# Patient Record
Sex: MALE | Race: WHITE | NOT HISPANIC OR LATINO | Employment: OTHER | URBAN - METROPOLITAN AREA
[De-identification: names, ages, dates, MRNs, and addresses within clinical notes are randomized per-mention and may not be internally consistent; named-entity substitution may affect disease eponyms.]

---

## 2017-05-02 PROBLEM — Z85.828 PERSONAL HISTORY OF OTHER MALIGNANT NEOPLASM OF SKIN: Status: ACTIVE | Noted: 2017-05-02

## 2017-05-02 PROBLEM — C44.319 BASAL CELL CARCINOMA OF SKIN OF OTHER PARTS OF FACE: Status: ACTIVE | Noted: 2017-05-02

## 2017-05-16 PROBLEM — D49.2 NEOPLASM OF UNSPECIFIED BEHAVIOR OF BONE, SOFT TISSUE, AND SKIN: Status: RESOLVED | Noted: 2017-05-02 | Resolved: 2017-05-16

## 2017-07-03 ENCOUNTER — APPOINTMENT (OUTPATIENT)
Dept: RADIOLOGY | Facility: MEDICAL CENTER | Age: 72
End: 2017-07-03
Attending: NEUROLOGICAL SURGERY
Payer: MEDICARE

## 2017-07-03 ENCOUNTER — HOSPITAL ENCOUNTER (OUTPATIENT)
Facility: MEDICAL CENTER | Age: 72
End: 2017-07-03
Attending: NEUROLOGICAL SURGERY | Admitting: NEUROLOGICAL SURGERY
Payer: MEDICARE

## 2017-07-03 VITALS
OXYGEN SATURATION: 92 % | BODY MASS INDEX: 23.09 KG/M2 | HEART RATE: 79 BPM | TEMPERATURE: 98 F | DIASTOLIC BLOOD PRESSURE: 93 MMHG | HEIGHT: 71 IN | WEIGHT: 164.9 LBS | RESPIRATION RATE: 16 BRPM | SYSTOLIC BLOOD PRESSURE: 162 MMHG

## 2017-07-03 DIAGNOSIS — M48.061 SPINAL STENOSIS, LUMBAR REGION, WITHOUT NEUROGENIC CLAUDICATION: ICD-10-CM

## 2017-07-03 PROCEDURE — 160002 HCHG RECOVERY MINUTES (STAT)

## 2017-07-03 PROCEDURE — 72132 CT LUMBAR SPINE W/DYE: CPT

## 2017-07-03 PROCEDURE — 62284 INJECTION FOR MYELOGRAM: CPT

## 2017-07-03 PROCEDURE — 700117 HCHG RX CONTRAST REV CODE 255: Performed by: NEUROLOGICAL SURGERY

## 2017-07-03 RX ADMIN — IOHEXOL 15 ML: 180 INJECTION INTRAVENOUS at 14:10

## 2017-07-03 ASSESSMENT — PAIN SCALES - GENERAL
PAINLEVEL_OUTOF10: 0

## 2017-07-03 NOTE — OR SURGEON
Immediate Post- Operative Note  PostOp Diagnosis: lumbar stenosis      Procedure(s): fluoro guided lumbar puncture      Estimated Blood Loss: Less than 5 ml        Complications: None            7/3/2017     2:38 PM     Cyril Noyola

## 2017-07-03 NOTE — OR NURSING
1434 Received to PPU #6, awake and alert. With band aid to lumbar area,CDI. Denied c/o pain or numbness or tingling and able to move all extremities w/o difficulties and no  weakness noted. Noted nerve stimulator generator in  Left flank area.  1445 Given po fluids and snacks.  1510 Tolerated po intake well. No complaints. Called wife via phone and given update.  1600 Sleeping , no signs of discomfort.  1619 Given more snacks. Site w/o bleeding.  1715 Called Dr Noyola and got order to discharge patient, carried out.  1720 CNA assisted patient up and prepared for discharge. Wore back brace and used cane with ambulation. Tolerated activities w/o c/o headache or other discomfort.  1732 Dc'd home with wife by CNA on a wheelchair with all belongings.

## 2017-07-03 NOTE — IP AVS SNAPSHOT
" Home Care Instructions                                                                                                                Name:Duane Stuart Wilkerson  Medical Record Number:[id number]  CSN: [id number]    YOB: 1945   Age: 71 y.o.  Sex: male  HT:1.803 m (5' 11\") WT: 74.8 kg (164 lb 14.5 oz)          Admit Date: 7/3/2017     Discharge Date:   Today's Date: 7/3/2017  Attending Doctor:  Maria Antonia Martines M.D.                  Allergies:  Review of patient's allergies indicates no known allergies.                Discharge Instructions        [phone] is, in general, a safe procedure with only minor side effects. The following instructions have been written in order to assist you in preventing side effects and to improve your safety.     1. Do not drive a motorized vehicle for at least 18 hours following your myelogram. A friend or family member should be available to take you home following your discharge.   2. Resume your usual diet.  3. Drink plenty of fluids.  4. Do not drink alcohol for 24 hours following your myelogram.  5. If headaches develop, a mild analgesic such as Tylenol can be taken.   6. No strenuous activity should be undertaken for at least 24 hours following the myelogram.  7. When you are in bed or laying down at home, your head should be elevated at least 30 degrees (on at least two pillows) for 24 hours following the myelogram.  8. You should spend tonight in the same house with a responsible person in case any difficulties should arise.   9. If any questions arise, call the radiologist at [phone] or your Physicians office. You can also call the HEALTH HOTLINE open 24 hours/day, 7 days/week and speak to a nurse at [phone], or toll free at [phone].  10. You should call 911 if you develop problems with breathing or chest pain.    FOR PROBLEMS CALL [facility] AT: [phone]  FOLLOW UP WITH DR MARTINES IN 1 WEEK CALL TO SCHEDULE -[phone]    I acknowledge " receipt and understanding of these Home Care instructions.       Medication List      ASK your doctor about these medications        Instructions    Morning Afternoon Evening Bedtime    hydrochlorothiazide 25 MG Tabs   Commonly known as:  HYDRODIURIL        Take 1 Tab by mouth every day.   Dose:  25 mg                        lactobacillus rhamnosus (GG) Caps   Commonly known as:  CULTURELLE        Take 1 Cap by mouth every day.   Dose:  1 Cap                        omeprazole 20 MG delayed-release capsule   Commonly known as:  PRILOSEC        Take 1 Cap by mouth every day.   Dose:  20 mg                        oxycodone immediate release 10 MG immediate release tablet   Commonly known as:  ROXICODONE        Take 1 Tab by mouth every four hours as needed.   Dose:  10 mg                        potassium chloride SA 20 MEQ Tbcr   Commonly known as:  Kdur        Take 2 Tabs by mouth 2 Times a Day.   Dose:  40 mEq                        sertraline 100 MG Tabs   Commonly known as:  ZOLOFT        Take 1 Tab by mouth every day.   Dose:  100 mg                        simvastatin 40 MG Tabs   Commonly known as:  ZOCOR        Take 1 Tab by mouth every evening.   Dose:  40 mg                        tamsulosin 0.4 MG capsule   Commonly known as:  FLOMAX        Take 1 Cap by mouth ONE-HALF HOUR AFTER BREAKFAST.   Dose:  0.4 mg                                Medication Information     Above and/or attached are the medications your physician expects you to take upon discharge. Review all of your home medications and newly ordered medications with your doctor and/or pharmacist. Follow medication instructions as directed by your doctor and/or pharmacist. Please keep your medication list with you and share with your physician. Update the information when medications are discontinued, doses are changed, or new medications (including over-the-counter products) are added; and carry medication information at all times in the event of  emergency situations.        Resources     Quit Smoking / Tobacco Use:    I understand the use of any tobacco products increases my chance of suffering from future heart disease or stroke and could cause other illnesses which may shorten my life. Quitting the use of tobacco products is the single most important thing I can do to improve my health. For further information on smoking / tobacco cessation call a Toll Free Quit Line at 1-352.184.1992 (*[employer] [place]) or 1-820.666.9546 (American [employer] Association) or you can access the web based program at www.lungusa.org.    [place] Tobacco Users Help Line:  (641) 127-8567       Toll Free: 1-274.149.5301  Quit Tobacco Program [facility] (268)662-0535    Crisis Hotline:    [place] Crisis Hotline:  0-139-JLNJTFV or 1-599.611.3462    [place] Crisis Hotline:    1-383.177.3020 or 557-189-9091    Discharge Survey:   Thank you for choosing [facility]. We hope we did everything we could to make your hospital stay a pleasant one. You may be receiving a survey and we would appreciate your time and participation in answering the questions. Your input is very valuable to us in our efforts to improve our service to our patients and their families.            Signatures     My signature on this form indicates that:    1. I acknowledge receipt and understanding of these Home Care Instruction.  2. My questions regarding this information have been answered to my satisfaction.  3. I have formulated a plan with my discharge nurse to obtain my prescribed medications for home.    __________________________________      __________________________________                   Patient Signature                                 Guardian/Responsible Adult Signature      __________________________________                 __________       ________                       Nurse Signature                                               Date                 Time

## 2017-07-03 NOTE — DISCHARGE INSTRUCTIONS
[phone] is, in general, a safe procedure with only minor side effects. The following instructions have been written in order to assist you in preventing side effects and to improve your safety.     1. Do not drive a motorized vehicle for at least 18 hours following your myelogram. A friend or family member should be available to take you home following your discharge.   2. Resume your usual diet.  3. Drink plenty of fluids.  4. Do not drink alcohol for 24 hours following your myelogram.  5. If headaches develop, a mild analgesic such as Tylenol can be taken.   6. No strenuous activity should be undertaken for at least 24 hours following the myelogram.  7. When you are in bed or laying down at home, your head should be elevated at least 30 degrees (on at least two pillows) for 24 hours following the myelogram.  8. You should spend tonight in the same house with a responsible person in case any difficulties should arise.   9. If any questions arise, call the radiologist at [phone] or your Physicians office. You can also call the HEALTH HOTLINE open 24 hours/day, 7 days/week and speak to a nurse at [phone], or toll free at [phone].  10. You should call 911 if you develop problems with breathing or chest pain.    FOR PROBLEMS CALL [facility] AT: [phone]  FOLLOW UP WITH DR MARTINES IN 1 WEEK CALL TO SCHEDULE -[phone]    I acknowledge receipt and understanding of these Home Care instructions.

## 2017-07-03 NOTE — IP AVS SNAPSHOT
7/3/2017    Duane Ryan Leon  [address]  [address]  [facility] [address]    Dear Duane:    [facility] wants to ensure your discharge home is safe and you or your loved ones have had all of your questions answered regarding your care after you leave the hospital.    Below is a list of resources and contact information should you have any questions regarding your hospital stay, follow-up instructions, or active medical symptoms.    Questions or Concerns Regarding… Contact   Medical Questions Related to Your Discharge  (7 days a week, 8am-5pm) Contact a Nurse Care Coordinator   [phone]   Medical Questions Not Related to Your Discharge  (24 hours a day / 7 days a week)  Contact the Nurse Health Line   [phone]    Medications or Discharge Instructions Refer to your discharge packet   or contact your [facility] Primary Care Provider   [phone]   Follow-up Appointment(s) Schedule your appointment via [employer]   or contact Scheduling [phone]   Billing Review your statement via [employer]  or contact Billing [phone]   Medical Records Review your records via [employer]   or contact Medical Records [phone]     You may receive a telephone call within two days of discharge. This call is to make certain you understand your discharge instructions and have the opportunity to have any questions answered. You can also easily access your medical information, test results and upcoming appointments via the [employer] free online health management tool. You can learn more and sign up at [employer]. For assistance setting up your [employer] account, please call [phone].    Once again, we want to ensure your discharge home is safe and that you have a clear understanding of any next steps in your care. If you have any questions or concerns, please do not hesitate to contact us, we are here for you. Thank you for choosing [facility] for your healthcare needs.    Sincerely,    Your [facility]  Team

## 2017-07-06 ENCOUNTER — HOSPITAL ENCOUNTER (OUTPATIENT)
Dept: RADIOLOGY | Facility: MEDICAL CENTER | Age: 72
End: 2017-07-06
Attending: PHYSICIAN ASSISTANT
Payer: MEDICARE

## 2017-07-06 DIAGNOSIS — I73.9 PERIPHERAL VASCULAR DISEASE, UNSPECIFIED (HCC): ICD-10-CM

## 2017-07-06 PROCEDURE — 93925 LOWER EXTREMITY STUDY: CPT

## 2017-07-06 PROCEDURE — 93922 UPR/L XTREMITY ART 2 LEVELS: CPT

## 2017-08-11 ENCOUNTER — HOSPITAL ENCOUNTER (OUTPATIENT)
Dept: PAIN MANAGEMENT | Facility: REHABILITATION | Age: 72
End: 2017-08-11
Attending: PHYSICAL MEDICINE & REHABILITATION
Payer: MEDICARE

## 2017-08-11 ENCOUNTER — HOSPITAL ENCOUNTER (OUTPATIENT)
Dept: RADIOLOGY | Facility: REHABILITATION | Age: 72
End: 2017-08-11
Attending: PHYSICAL MEDICINE & REHABILITATION
Payer: MEDICARE

## 2017-08-11 VITALS
HEIGHT: 71 IN | OXYGEN SATURATION: 98 % | RESPIRATION RATE: 18 BRPM | DIASTOLIC BLOOD PRESSURE: 83 MMHG | HEART RATE: 75 BPM | TEMPERATURE: 97.4 F | SYSTOLIC BLOOD PRESSURE: 130 MMHG | BODY MASS INDEX: 22.93 KG/M2 | WEIGHT: 163.8 LBS

## 2017-08-11 PROCEDURE — 700111 HCHG RX REV CODE 636 W/ 250 OVERRIDE (IP)

## 2017-08-11 PROCEDURE — 700117 HCHG RX CONTRAST REV CODE 255

## 2017-08-11 PROCEDURE — 64484 NJX AA&/STRD TFRM EPI L/S EA: CPT

## 2017-08-11 PROCEDURE — 64483 NJX AA&/STRD TFRM EPI L/S 1: CPT

## 2017-08-11 RX ORDER — LIDOCAINE HYDROCHLORIDE 10 MG/ML
INJECTION, SOLUTION EPIDURAL; INFILTRATION; INTRACAUDAL; PERINEURAL
Status: COMPLETED
Start: 2017-08-11 | End: 2017-08-11

## 2017-08-11 RX ORDER — DEXAMETHASONE SODIUM PHOSPHATE 10 MG/ML
INJECTION, SOLUTION INTRAMUSCULAR; INTRAVENOUS
Status: COMPLETED
Start: 2017-08-11 | End: 2017-08-11

## 2017-08-11 RX ADMIN — IOHEXOL 5 ML: 240 INJECTION, SOLUTION INTRATHECAL; INTRAVASCULAR; INTRAVENOUS; ORAL at 14:00

## 2017-08-11 RX ADMIN — DEXAMETHASONE SODIUM PHOSPHATE 10 MG: 10 INJECTION, SOLUTION INTRAMUSCULAR; INTRAVENOUS at 14:00

## 2017-08-11 RX ADMIN — LIDOCAINE HYDROCHLORIDE 5 ML: 10 INJECTION, SOLUTION EPIDURAL; INFILTRATION; INTRACAUDAL; PERINEURAL at 14:30

## 2017-08-11 ASSESSMENT — PAIN SCALES - GENERAL
PAINLEVEL_OUTOF10: 2
PAINLEVEL_OUTOF10: 3

## 2017-08-11 NOTE — PROGRESS NOTES
Current medications reviewed with pt, see medications reconciliation form. Pt rufino taking ASA or other blood thinners or anti-inflammatories.  Pt has a ride post-procedure(wife to drive).  Printed and verbal discharge instructions given to pt who verbalized understanding.

## 2017-08-11 NOTE — PROCEDURES
SELECTIVE NERVE ROOT BLOCK/TRANSFORAMINAL EPIDURAL INJECTION    LEVEL(S): L1-L2, L2-L3   SIDE: LEFT     PRE-PROCEDURE DIAGNOSIS: Intervebral disc displacement/degeneration  POST-PROCEDURE DIAGNOSIS: Same  PHYSICIAN: Maxx Gore MD  ANESTHESIA: Local     The patient was interviewed and the medical record reviewed.  There were no medical, pharmacologic, radiographic or other structural contraindications to attempting fluoroscopically guided selective nerve root block/transforaminal epidural steroid injection.  Risks and expected side effects (including, but not limited to, potential for failure of the procedure or worsening of pain, bleeding, infection, nerve injury, dural tear with spinal fluid leak, systemic reaction to the contrast, anesthetic, or steroid, anaphylactic shock, and elevation of blood sugar) as well as potential benefit of the procedure were reviewed with the patient and all concerns addressed.  The printed consent form was signed and witnessed. A standard time-out procedure was performed.     DESCRIPTION OF PROCEDURE:  The patient was placed in the prone position on the fluoroscopy table. A Chlorhexidine prep was performed in a wide area over the lumbar region and sterile drapes were applied. Fluoroscopy was utilized to identify the above documented neural foramen/foramina.  Local anesthesia was obtained with 1% lidocaine. A 22 gauge curved tip spinal needle was then inserted, advanced with fluoroscopic guidance into each neural foramen, confirmed on the lateral view.  After negative aspiration, 1 ml of Omnipaque 300 was injected confirming position in A/P and lateral views.  This showed a good spread of dye transforaminally into the epidural space.  There was no vascular update with contrast injection under continuous fluoroscopy. 5 mg of Dexamethasone was injected, followed by 0.5 ml of 1% Lidocaine at each of the above documented levels.      The patient tolerated the procedure well.  Post-procedural instructions were provided and follow up plans discussed. The patient was instructed to contact the office if any problems any possible injection related problems or side effects were experienced. After having met discharge criteria, the patient was discharged in good condition.     Comments: No complications

## 2017-09-12 ENCOUNTER — HOSPITAL ENCOUNTER (OUTPATIENT)
Dept: LAB | Facility: MEDICAL CENTER | Age: 72
End: 2017-09-12
Attending: SURGERY
Payer: MEDICARE

## 2017-09-12 ENCOUNTER — HOSPITAL ENCOUNTER (OUTPATIENT)
Dept: RADIOLOGY | Facility: MEDICAL CENTER | Age: 72
End: 2017-09-12
Attending: SURGERY
Payer: MEDICARE

## 2017-09-12 DIAGNOSIS — I73.9 PERIPHERAL VASCULAR DISEASE, UNSPECIFIED (HCC): ICD-10-CM

## 2017-09-12 LAB
CREAT SERPL-MCNC: 1.03 MG/DL (ref 0.5–1.4)
GFR SERPL CREATININE-BSD FRML MDRD: >60 ML/MIN/1.73 M 2

## 2017-09-12 PROCEDURE — 75635 CT ANGIO ABDOMINAL ARTERIES: CPT

## 2017-09-12 PROCEDURE — 700117 HCHG RX CONTRAST REV CODE 255: Performed by: SURGERY

## 2017-09-12 PROCEDURE — 82565 ASSAY OF CREATININE: CPT

## 2017-09-12 PROCEDURE — 36415 COLL VENOUS BLD VENIPUNCTURE: CPT

## 2017-09-12 RX ADMIN — IOHEXOL 100 ML: 350 INJECTION, SOLUTION INTRAVENOUS at 16:40

## 2017-10-23 ENCOUNTER — APPOINTMENT (OUTPATIENT)
Dept: ADMISSIONS | Facility: MEDICAL CENTER | Age: 72
DRG: 272 | End: 2017-10-23
Attending: SURGERY
Payer: MEDICARE

## 2017-10-23 RX ORDER — SIMVASTATIN 40 MG
40 TABLET ORAL
COMMUNITY

## 2017-10-23 RX ORDER — HYDROCHLOROTHIAZIDE 25 MG/1
25 TABLET ORAL
COMMUNITY

## 2017-10-23 RX ORDER — BENAZEPRIL HYDROCHLORIDE 10 MG/1
10 TABLET ORAL
COMMUNITY

## 2017-10-23 RX ORDER — OXYBUTYNIN CHLORIDE 5 MG/1
5 TABLET ORAL
COMMUNITY

## 2017-10-23 RX ORDER — DESONIDE 0.5 MG/G
CREAM TOPICAL
Status: ON HOLD | COMMUNITY
End: 2018-08-02

## 2017-10-23 RX ORDER — SERTRALINE HYDROCHLORIDE 100 MG/1
100 TABLET, FILM COATED ORAL
COMMUNITY

## 2017-10-23 RX ORDER — ERGOCALCIFEROL 1.25 MG/1
50000 CAPSULE ORAL
Status: ON HOLD | COMMUNITY
End: 2018-08-02

## 2017-10-23 RX ORDER — OMEPRAZOLE 20 MG/1
20 CAPSULE, DELAYED RELEASE ORAL
COMMUNITY

## 2017-10-26 ENCOUNTER — APPOINTMENT (OUTPATIENT)
Dept: RADIOLOGY | Facility: MEDICAL CENTER | Age: 72
DRG: 272 | End: 2017-10-26
Attending: SURGERY
Payer: MEDICARE

## 2017-10-26 ENCOUNTER — HOSPITAL ENCOUNTER (INPATIENT)
Facility: MEDICAL CENTER | Age: 72
LOS: 3 days | DRG: 272 | End: 2017-10-29
Attending: SURGERY | Admitting: SURGERY
Payer: MEDICARE

## 2017-10-26 PROBLEM — I70.212 ATHEROSCLEROSIS OF NATIVE ARTERY OF LEFT LOWER EXTREMITY WITH INTERMITTENT CLAUDICATION (HCC): Status: ACTIVE | Noted: 2017-10-26

## 2017-10-26 LAB
ANION GAP SERPL CALC-SCNC: 7 MMOL/L (ref 0–11.9)
BUN SERPL-MCNC: 17 MG/DL (ref 8–22)
CALCIUM SERPL-MCNC: 8.9 MG/DL (ref 8.5–10.5)
CHLORIDE SERPL-SCNC: 102 MMOL/L (ref 96–112)
CO2 SERPL-SCNC: 24 MMOL/L (ref 20–33)
CREAT SERPL-MCNC: 1.01 MG/DL (ref 0.5–1.4)
EKG IMPRESSION: NORMAL
ERYTHROCYTE [DISTWIDTH] IN BLOOD BY AUTOMATED COUNT: 48.5 FL (ref 35.9–50)
GFR SERPL CREATININE-BSD FRML MDRD: >60 ML/MIN/1.73 M 2
GLUCOSE SERPL-MCNC: 81 MG/DL (ref 65–99)
HCT VFR BLD AUTO: 38.5 % (ref 42–52)
HGB BLD-MCNC: 13.6 G/DL (ref 14–18)
MCH RBC QN AUTO: 35.3 PG (ref 27–33)
MCHC RBC AUTO-ENTMCNC: 35.3 G/DL (ref 33.7–35.3)
MCV RBC AUTO: 100 FL (ref 81.4–97.8)
PLATELET # BLD AUTO: 181 K/UL (ref 164–446)
PMV BLD AUTO: 9.1 FL (ref 9–12.9)
POTASSIUM SERPL-SCNC: 3.7 MMOL/L (ref 3.6–5.5)
RBC # BLD AUTO: 3.85 M/UL (ref 4.7–6.1)
SODIUM SERPL-SCNC: 133 MMOL/L (ref 135–145)
WBC # BLD AUTO: 7 K/UL (ref 4.8–10.8)

## 2017-10-26 PROCEDURE — 770006 HCHG ROOM/CARE - MED/SURG/GYN SEMI*

## 2017-10-26 PROCEDURE — A9270 NON-COVERED ITEM OR SERVICE: HCPCS | Performed by: SURGERY

## 2017-10-26 PROCEDURE — 700102 HCHG RX REV CODE 250 W/ 637 OVERRIDE(OP): Performed by: SURGERY

## 2017-10-26 PROCEDURE — 500371 HCHG DRAIN, BLAKE 10MM: Performed by: SURGERY

## 2017-10-26 PROCEDURE — 501445 HCHG STAPLER, SKIN DISP: Performed by: SURGERY

## 2017-10-26 PROCEDURE — 04UL3KZ SUPPLEMENT LEFT FEMORAL ARTERY WITH NONAUTOLOGOUS TISSUE SUBSTITUTE, PERCUTANEOUS APPROACH: ICD-10-PCS | Performed by: SURGERY

## 2017-10-26 PROCEDURE — 700101 HCHG RX REV CODE 250

## 2017-10-26 PROCEDURE — 501498 HCHG SURG-I-LOOP, MAXI (BLUE): Performed by: SURGERY

## 2017-10-26 PROCEDURE — 85027 COMPLETE CBC AUTOMATED: CPT

## 2017-10-26 PROCEDURE — 93005 ELECTROCARDIOGRAM TRACING: CPT | Performed by: SURGERY

## 2017-10-26 PROCEDURE — 93010 ELECTROCARDIOGRAM REPORT: CPT | Performed by: INTERNAL MEDICINE

## 2017-10-26 PROCEDURE — 501837 HCHG SUTURE CV: Performed by: SURGERY

## 2017-10-26 PROCEDURE — 160029 HCHG SURGERY MINUTES - 1ST 30 MINS LEVEL 4: Performed by: SURGERY

## 2017-10-26 PROCEDURE — 700102 HCHG RX REV CODE 250 W/ 637 OVERRIDE(OP)

## 2017-10-26 PROCEDURE — 160048 HCHG OR STATISTICAL LEVEL 1-5: Performed by: SURGERY

## 2017-10-26 PROCEDURE — C1894 INTRO/SHEATH, NON-LASER: HCPCS | Performed by: SURGERY

## 2017-10-26 PROCEDURE — A9270 NON-COVERED ITEM OR SERVICE: HCPCS

## 2017-10-26 PROCEDURE — 700111 HCHG RX REV CODE 636 W/ 250 OVERRIDE (IP)

## 2017-10-26 PROCEDURE — C1876 STENT, NON-COA/NON-COV W/DEL: HCPCS | Performed by: SURGERY

## 2017-10-26 PROCEDURE — 500389 HCHG DRAIN, RESERVOIR SUCT JP 100CC: Performed by: SURGERY

## 2017-10-26 PROCEDURE — 500700 HCHG HEMOCLIP, SMALL (RED): Performed by: SURGERY

## 2017-10-26 PROCEDURE — 500043 HCHG BAG-A-JET: Performed by: SURGERY

## 2017-10-26 PROCEDURE — A6402 STERILE GAUZE <= 16 SQ IN: HCPCS | Performed by: SURGERY

## 2017-10-26 PROCEDURE — 700101 HCHG RX REV CODE 250: Performed by: SURGERY

## 2017-10-26 PROCEDURE — 160041 HCHG SURGERY MINUTES - EA ADDL 1 MIN LEVEL 4: Performed by: SURGERY

## 2017-10-26 PROCEDURE — 501838 HCHG SUTURE GENERAL: Performed by: SURGERY

## 2017-10-26 PROCEDURE — 500896 HCHG PACK, VASCULAR (FOR ROOM 10): Performed by: SURGERY

## 2017-10-26 PROCEDURE — C1769 GUIDE WIRE: HCPCS | Performed by: SURGERY

## 2017-10-26 PROCEDURE — 501499 HCHG SURG-I-LOOP, MINI (BLUE): Performed by: SURGERY

## 2017-10-26 PROCEDURE — 160009 HCHG ANES TIME/MIN: Performed by: SURGERY

## 2017-10-26 PROCEDURE — 503055 HCHG PATCH XENOSURE 6-8 CM: Performed by: SURGERY

## 2017-10-26 PROCEDURE — 160002 HCHG RECOVERY MINUTES (STAT): Performed by: SURGERY

## 2017-10-26 PROCEDURE — 80048 BASIC METABOLIC PNL TOTAL CA: CPT

## 2017-10-26 PROCEDURE — 700112 HCHG RX REV CODE 229: Performed by: SURGERY

## 2017-10-26 PROCEDURE — 160036 HCHG PACU - EA ADDL 30 MINS PHASE I: Performed by: SURGERY

## 2017-10-26 PROCEDURE — B41D1ZZ FLUOROSCOPY OF AORTA AND BILATERAL LOWER EXTREMITY ARTERIES USING LOW OSMOLAR CONTRAST: ICD-10-PCS | Performed by: SURGERY

## 2017-10-26 PROCEDURE — 500697 HCHG HEMOCLIP, LARGE (ORANGE): Performed by: SURGERY

## 2017-10-26 PROCEDURE — 160035 HCHG PACU - 1ST 60 MINS PHASE I: Performed by: SURGERY

## 2017-10-26 PROCEDURE — 04CL3ZZ EXTIRPATION OF MATTER FROM LEFT FEMORAL ARTERY, PERCUTANEOUS APPROACH: ICD-10-PCS | Performed by: SURGERY

## 2017-10-26 PROCEDURE — 500698 HCHG HEMOCLIP, MEDIUM: Performed by: SURGERY

## 2017-10-26 PROCEDURE — 700111 HCHG RX REV CODE 636 W/ 250 OVERRIDE (IP): Performed by: SURGERY

## 2017-10-26 DEVICE — IMPLANTABLE DEVICE: Type: IMPLANTABLE DEVICE | Status: FUNCTIONAL

## 2017-10-26 DEVICE — PATCH .8X8CM XENOSURE BIOLOGIC VASCULAR---ORDER IN MULTIPLES OF 5---: Type: IMPLANTABLE DEVICE | Status: FUNCTIONAL

## 2017-10-26 RX ORDER — OXYCODONE HYDROCHLORIDE 10 MG/1
10 TABLET ORAL
Status: DISCONTINUED | OUTPATIENT
Start: 2017-10-26 | End: 2017-10-29 | Stop reason: HOSPADM

## 2017-10-26 RX ORDER — DEXTROSE MONOHYDRATE, SODIUM CHLORIDE, AND POTASSIUM CHLORIDE 50; 1.49; 4.5 G/1000ML; G/1000ML; G/1000ML
INJECTION, SOLUTION INTRAVENOUS CONTINUOUS
Status: DISCONTINUED | OUTPATIENT
Start: 2017-10-26 | End: 2017-10-29 | Stop reason: HOSPADM

## 2017-10-26 RX ORDER — HYDROCHLOROTHIAZIDE 25 MG/1
25 TABLET ORAL
Status: DISCONTINUED | OUTPATIENT
Start: 2017-10-26 | End: 2017-10-29 | Stop reason: HOSPADM

## 2017-10-26 RX ORDER — LIDOCAINE AND PRILOCAINE 25; 25 MG/G; MG/G
1 CREAM TOPICAL
Status: COMPLETED | OUTPATIENT
Start: 2017-10-26 | End: 2017-10-26

## 2017-10-26 RX ORDER — CELECOXIB 200 MG/1
200 CAPSULE ORAL 2 TIMES DAILY WITH MEALS
Status: DISCONTINUED | OUTPATIENT
Start: 2017-10-27 | End: 2017-10-29 | Stop reason: HOSPADM

## 2017-10-26 RX ORDER — DIPHENHYDRAMINE HYDROCHLORIDE 50 MG/ML
25 INJECTION INTRAMUSCULAR; INTRAVENOUS EVERY 6 HOURS PRN
Status: DISCONTINUED | OUTPATIENT
Start: 2017-10-26 | End: 2017-10-29 | Stop reason: HOSPADM

## 2017-10-26 RX ORDER — SODIUM CHLORIDE, SODIUM LACTATE, POTASSIUM CHLORIDE, CALCIUM CHLORIDE 600; 310; 30; 20 MG/100ML; MG/100ML; MG/100ML; MG/100ML
INJECTION, SOLUTION INTRAVENOUS CONTINUOUS
Status: DISCONTINUED | OUTPATIENT
Start: 2017-10-26 | End: 2017-10-29 | Stop reason: HOSPADM

## 2017-10-26 RX ORDER — DEXAMETHASONE SODIUM PHOSPHATE 4 MG/ML
4 INJECTION, SOLUTION INTRA-ARTICULAR; INTRALESIONAL; INTRAMUSCULAR; INTRAVENOUS; SOFT TISSUE
Status: DISCONTINUED | OUTPATIENT
Start: 2017-10-26 | End: 2017-10-29 | Stop reason: HOSPADM

## 2017-10-26 RX ORDER — BENAZEPRIL HYDROCHLORIDE 20 MG/1
10 TABLET ORAL
Status: DISCONTINUED | OUTPATIENT
Start: 2017-10-26 | End: 2017-10-29 | Stop reason: HOSPADM

## 2017-10-26 RX ORDER — LIDOCAINE HYDROCHLORIDE 10 MG/ML
INJECTION, SOLUTION INFILTRATION; PERINEURAL
Status: COMPLETED
Start: 2017-10-26 | End: 2017-10-26

## 2017-10-26 RX ORDER — ALBUTEROL SULFATE 90 UG/1
2 AEROSOL, METERED RESPIRATORY (INHALATION) EVERY 4 HOURS PRN
Status: DISCONTINUED | OUTPATIENT
Start: 2017-10-26 | End: 2017-10-29 | Stop reason: HOSPADM

## 2017-10-26 RX ORDER — TAMSULOSIN HYDROCHLORIDE 0.4 MG/1
0.4 CAPSULE ORAL EVERY EVENING
COMMUNITY

## 2017-10-26 RX ORDER — OXYCODONE HYDROCHLORIDE 5 MG/1
5 TABLET ORAL
Status: DISCONTINUED | OUTPATIENT
Start: 2017-10-26 | End: 2017-10-29 | Stop reason: HOSPADM

## 2017-10-26 RX ORDER — SIMVASTATIN 40 MG
40 TABLET ORAL
Status: DISCONTINUED | OUTPATIENT
Start: 2017-10-26 | End: 2017-10-29 | Stop reason: HOSPADM

## 2017-10-26 RX ORDER — OXYCODONE HCL 5 MG/5 ML
SOLUTION, ORAL ORAL
Status: COMPLETED
Start: 2017-10-26 | End: 2017-10-26

## 2017-10-26 RX ORDER — LIDOCAINE HYDROCHLORIDE 10 MG/ML
0.5 INJECTION, SOLUTION INFILTRATION; PERINEURAL
Status: COMPLETED | OUTPATIENT
Start: 2017-10-26 | End: 2017-10-26

## 2017-10-26 RX ORDER — DESONIDE 0.5 MG/G
CREAM TOPICAL
Status: DISCONTINUED | OUTPATIENT
Start: 2017-10-26 | End: 2017-10-29 | Stop reason: HOSPADM

## 2017-10-26 RX ORDER — HYDRALAZINE HYDROCHLORIDE 20 MG/ML
10 INJECTION INTRAMUSCULAR; INTRAVENOUS
Status: DISCONTINUED | OUTPATIENT
Start: 2017-10-26 | End: 2017-10-29 | Stop reason: HOSPADM

## 2017-10-26 RX ORDER — DOCUSATE SODIUM 100 MG/1
100 CAPSULE, LIQUID FILLED ORAL 2 TIMES DAILY
Status: DISCONTINUED | OUTPATIENT
Start: 2017-10-26 | End: 2017-10-29 | Stop reason: HOSPADM

## 2017-10-26 RX ORDER — OMEPRAZOLE 20 MG/1
20 CAPSULE, DELAYED RELEASE ORAL
Status: DISCONTINUED | OUTPATIENT
Start: 2017-10-26 | End: 2017-10-29 | Stop reason: HOSPADM

## 2017-10-26 RX ORDER — SERTRALINE HYDROCHLORIDE 100 MG/1
100 TABLET, FILM COATED ORAL
Status: DISCONTINUED | OUTPATIENT
Start: 2017-10-26 | End: 2017-10-29 | Stop reason: HOSPADM

## 2017-10-26 RX ORDER — BUPIVACAINE HYDROCHLORIDE AND EPINEPHRINE 5; 5 MG/ML; UG/ML
INJECTION, SOLUTION EPIDURAL; INTRACAUDAL; PERINEURAL
Status: DISCONTINUED | OUTPATIENT
Start: 2017-10-26 | End: 2017-10-26 | Stop reason: HOSPADM

## 2017-10-26 RX ORDER — DIPHENHYDRAMINE HCL 25 MG
25 TABLET ORAL EVERY 6 HOURS PRN
Status: DISCONTINUED | OUTPATIENT
Start: 2017-10-26 | End: 2017-10-29 | Stop reason: HOSPADM

## 2017-10-26 RX ORDER — TAMSULOSIN HYDROCHLORIDE 0.4 MG/1
0.4 CAPSULE ORAL EVERY EVENING
Status: DISCONTINUED | OUTPATIENT
Start: 2017-10-26 | End: 2017-10-29 | Stop reason: HOSPADM

## 2017-10-26 RX ORDER — ACETAMINOPHEN 325 MG/1
650 TABLET ORAL EVERY 6 HOURS
Status: DISCONTINUED | OUTPATIENT
Start: 2017-10-26 | End: 2017-10-29 | Stop reason: HOSPADM

## 2017-10-26 RX ORDER — DEXTROSE MONOHYDRATE, SODIUM CHLORIDE, AND POTASSIUM CHLORIDE 50; 1.49; 4.5 G/1000ML; G/1000ML; G/1000ML
INJECTION, SOLUTION INTRAVENOUS
Status: DISPENSED
Start: 2017-10-26 | End: 2017-10-27

## 2017-10-26 RX ORDER — OXYBUTYNIN CHLORIDE 5 MG/1
5 TABLET ORAL
Status: DISCONTINUED | OUTPATIENT
Start: 2017-10-26 | End: 2017-10-29 | Stop reason: HOSPADM

## 2017-10-26 RX ORDER — HEPARIN SODIUM,PORCINE 1000/ML
VIAL (ML) INJECTION
Status: DISCONTINUED | OUTPATIENT
Start: 2017-10-26 | End: 2017-10-26 | Stop reason: HOSPADM

## 2017-10-26 RX ORDER — TRAZODONE HYDROCHLORIDE 50 MG/1
50 TABLET ORAL NIGHTLY PRN
Status: DISCONTINUED | OUTPATIENT
Start: 2017-10-26 | End: 2017-10-29 | Stop reason: HOSPADM

## 2017-10-26 RX ORDER — SCOLOPAMINE TRANSDERMAL SYSTEM 1 MG/1
1 PATCH, EXTENDED RELEASE TRANSDERMAL
Status: DISCONTINUED | OUTPATIENT
Start: 2017-10-26 | End: 2017-10-29 | Stop reason: HOSPADM

## 2017-10-26 RX ORDER — IPRATROPIUM BROMIDE AND ALBUTEROL SULFATE 2.5; .5 MG/3ML; MG/3ML
3 SOLUTION RESPIRATORY (INHALATION)
Status: DISCONTINUED | OUTPATIENT
Start: 2017-10-26 | End: 2017-10-29 | Stop reason: HOSPADM

## 2017-10-26 RX ORDER — HALOPERIDOL 5 MG/ML
1 INJECTION INTRAMUSCULAR EVERY 6 HOURS PRN
Status: DISCONTINUED | OUTPATIENT
Start: 2017-10-26 | End: 2017-10-29 | Stop reason: HOSPADM

## 2017-10-26 RX ORDER — ONDANSETRON 2 MG/ML
4 INJECTION INTRAMUSCULAR; INTRAVENOUS EVERY 4 HOURS PRN
Status: DISCONTINUED | OUTPATIENT
Start: 2017-10-26 | End: 2017-10-29 | Stop reason: HOSPADM

## 2017-10-26 RX ORDER — CALCIUM CARBONATE 500 MG/1
500 TABLET, CHEWABLE ORAL
Status: DISCONTINUED | OUTPATIENT
Start: 2017-10-26 | End: 2017-10-29 | Stop reason: HOSPADM

## 2017-10-26 RX ORDER — KETOROLAC TROMETHAMINE 30 MG/ML
15 INJECTION, SOLUTION INTRAMUSCULAR; INTRAVENOUS EVERY 6 HOURS
Status: DISPENSED | OUTPATIENT
Start: 2017-10-26 | End: 2017-10-27

## 2017-10-26 RX ORDER — LABETALOL HYDROCHLORIDE 5 MG/ML
10 INJECTION, SOLUTION INTRAVENOUS
Status: DISCONTINUED | OUTPATIENT
Start: 2017-10-26 | End: 2017-10-29 | Stop reason: HOSPADM

## 2017-10-26 RX ORDER — NICOTINE 21 MG/24HR
21 PATCH, TRANSDERMAL 24 HOURS TRANSDERMAL
Status: DISCONTINUED | OUTPATIENT
Start: 2017-10-26 | End: 2017-10-29 | Stop reason: HOSPADM

## 2017-10-26 RX ADMIN — FENTANYL CITRATE 25 MCG: 50 INJECTION, SOLUTION INTRAMUSCULAR; INTRAVENOUS at 14:22

## 2017-10-26 RX ADMIN — OMEPRAZOLE 20 MG: 20 CAPSULE, DELAYED RELEASE ORAL at 20:30

## 2017-10-26 RX ADMIN — OXYBUTYNIN CHLORIDE 5 MG: 5 TABLET ORAL at 20:30

## 2017-10-26 RX ADMIN — TAMSULOSIN HYDROCHLORIDE 0.4 MG: 0.4 CAPSULE ORAL at 20:30

## 2017-10-26 RX ADMIN — SODIUM CHLORIDE, SODIUM LACTATE, POTASSIUM CHLORIDE, CALCIUM CHLORIDE: 600; 310; 30; 20 INJECTION, SOLUTION INTRAVENOUS at 09:40

## 2017-10-26 RX ADMIN — OXYCODONE HYDROCHLORIDE 10 MG: 10 TABLET ORAL at 15:43

## 2017-10-26 RX ADMIN — DOCUSATE SODIUM 100 MG: 100 CAPSULE ORAL at 20:29

## 2017-10-26 RX ADMIN — ASPIRIN 81 MG: 81 TABLET, COATED ORAL at 20:30

## 2017-10-26 RX ADMIN — POTASSIUM CHLORIDE, DEXTROSE MONOHYDRATE AND SODIUM CHLORIDE: 150; 5; 450 INJECTION, SOLUTION INTRAVENOUS at 15:59

## 2017-10-26 RX ADMIN — OXYCODONE HYDROCHLORIDE 10 MG: 5 SOLUTION ORAL at 14:20

## 2017-10-26 RX ADMIN — ACETAMINOPHEN 650 MG: 325 TABLET, FILM COATED ORAL at 15:37

## 2017-10-26 RX ADMIN — FENTANYL CITRATE 50 MCG: 50 INJECTION, SOLUTION INTRAMUSCULAR; INTRAVENOUS at 14:33

## 2017-10-26 RX ADMIN — KETOROLAC TROMETHAMINE 15 MG: 30 INJECTION, SOLUTION INTRAMUSCULAR at 19:15

## 2017-10-26 RX ADMIN — KETOROLAC TROMETHAMINE 15 MG: 30 INJECTION, SOLUTION INTRAMUSCULAR at 15:37

## 2017-10-26 RX ADMIN — SERTRALINE 100 MG: 100 TABLET, FILM COATED ORAL at 20:30

## 2017-10-26 RX ADMIN — SIMVASTATIN 40 MG: 40 TABLET, FILM COATED ORAL at 20:30

## 2017-10-26 RX ADMIN — HYDROCHLOROTHIAZIDE 25 MG: 25 TABLET ORAL at 20:30

## 2017-10-26 RX ADMIN — LIDOCAINE HYDROCHLORIDE 0.5 ML: 10 INJECTION, SOLUTION INFILTRATION; PERINEURAL at 09:40

## 2017-10-26 RX ADMIN — NICOTINE 21 MG: 21 PATCH, EXTENDED RELEASE TRANSDERMAL at 15:38

## 2017-10-26 RX ADMIN — ACETAMINOPHEN 650 MG: 325 TABLET, FILM COATED ORAL at 19:15

## 2017-10-26 ASSESSMENT — PAIN SCALES - GENERAL
PAINLEVEL_OUTOF10: 0
PAINLEVEL_OUTOF10: 5
PAINLEVEL_OUTOF10: 2
PAINLEVEL_OUTOF10: 0
PAINLEVEL_OUTOF10: 5
PAINLEVEL_OUTOF10: 7
PAINLEVEL_OUTOF10: 0
PAINLEVEL_OUTOF10: 4
PAINLEVEL_OUTOF10: 4

## 2017-10-26 ASSESSMENT — COPD QUESTIONNAIRES
COPD SCREENING SCORE: 6
DO YOU EVER COUGH UP ANY MUCUS OR PHLEGM?: YES, A FEW DAYS A WEEK OR MONTH
HAVE YOU SMOKED AT LEAST 100 CIGARETTES IN YOUR ENTIRE LIFE: YES
DURING THE PAST 4 WEEKS HOW MUCH DID YOU FEEL SHORT OF BREATH: NONE/LITTLE OF THE TIME

## 2017-10-26 ASSESSMENT — LIFESTYLE VARIABLES
HOW MANY TIMES IN THE PAST YEAR HAVE YOU HAD 5 OR MORE DRINKS IN A DAY: 0
CONSUMPTION TOTAL: NEGATIVE
AVERAGE NUMBER OF DAYS PER WEEK YOU HAVE A DRINK CONTAINING ALCOHOL: 5
TOTAL SCORE: 0
EVER FELT BAD OR GUILTY ABOUT YOUR DRINKING: NO
ON A TYPICAL DAY WHEN YOU DRINK ALCOHOL HOW MANY DRINKS DO YOU HAVE: 2
ALCOHOL_USE: YES
EVER_SMOKED: YES
TOTAL SCORE: 0
HAVE PEOPLE ANNOYED YOU BY CRITICIZING YOUR DRINKING: NO
HAVE YOU EVER FELT YOU SHOULD CUT DOWN ON YOUR DRINKING: NO
TOTAL SCORE: 0
EVER HAD A DRINK FIRST THING IN THE MORNING TO STEADY YOUR NERVES TO GET RID OF A HANGOVER: NO
EVER_SMOKED: YES

## 2017-10-26 ASSESSMENT — PATIENT HEALTH QUESTIONNAIRE - PHQ9
1. LITTLE INTEREST OR PLEASURE IN DOING THINGS: NOT AT ALL
SUM OF ALL RESPONSES TO PHQ QUESTIONS 1-9: 0
SUM OF ALL RESPONSES TO PHQ9 QUESTIONS 1 AND 2: 0
2. FEELING DOWN, DEPRESSED, IRRITABLE, OR HOPELESS: NOT AT ALL

## 2017-10-26 NOTE — PROGRESS NOTES
8442 Received report, introduced self to pt, reviewed labs, orders, allergies, code status. Skin on heels, sacrum and buttocks intact. prevena wound vac in place, site on L groin is c/d/i and pt is c/o pain, will be medicated, see MAR. Pt given apple juice and water, tolerating well, wishes to have regular dinner. Will advance diet per active order.

## 2017-10-26 NOTE — PROGRESS NOTES
The Medication Reconciliation process has been completed by interviewing the patient    Allergies have been reviewed  Antibiotic use in 30 days - none    Home Pharmacy:  [facility]

## 2017-10-27 LAB
ANION GAP SERPL CALC-SCNC: 9 MMOL/L (ref 0–11.9)
BASOPHILS # BLD AUTO: 0.2 % (ref 0–1.8)
BASOPHILS # BLD: 0.02 K/UL (ref 0–0.12)
BUN SERPL-MCNC: 21 MG/DL (ref 8–22)
CALCIUM SERPL-MCNC: 8.1 MG/DL (ref 8.5–10.5)
CHLORIDE SERPL-SCNC: 103 MMOL/L (ref 96–112)
CO2 SERPL-SCNC: 22 MMOL/L (ref 20–33)
CREAT SERPL-MCNC: 1.13 MG/DL (ref 0.5–1.4)
EOSINOPHIL # BLD AUTO: 0.01 K/UL (ref 0–0.51)
EOSINOPHIL NFR BLD: 0.1 % (ref 0–6.9)
ERYTHROCYTE [DISTWIDTH] IN BLOOD BY AUTOMATED COUNT: 50 FL (ref 35.9–50)
GFR SERPL CREATININE-BSD FRML MDRD: >60 ML/MIN/1.73 M 2
GLUCOSE SERPL-MCNC: 113 MG/DL (ref 65–99)
HCT VFR BLD AUTO: 33 % (ref 42–52)
HGB BLD-MCNC: 11.1 G/DL (ref 14–18)
IMM GRANULOCYTES # BLD AUTO: 0.08 K/UL (ref 0–0.11)
IMM GRANULOCYTES NFR BLD AUTO: 0.7 % (ref 0–0.9)
LYMPHOCYTES # BLD AUTO: 0.99 K/UL (ref 1–4.8)
LYMPHOCYTES NFR BLD: 8.3 % (ref 22–41)
MAGNESIUM SERPL-MCNC: 1.7 MG/DL (ref 1.5–2.5)
MCH RBC QN AUTO: 34.6 PG (ref 27–33)
MCHC RBC AUTO-ENTMCNC: 33.6 G/DL (ref 33.7–35.3)
MCV RBC AUTO: 102.8 FL (ref 81.4–97.8)
MONOCYTES # BLD AUTO: 1.07 K/UL (ref 0–0.85)
MONOCYTES NFR BLD AUTO: 9 % (ref 0–13.4)
NEUTROPHILS # BLD AUTO: 9.72 K/UL (ref 1.82–7.42)
NEUTROPHILS NFR BLD: 81.7 % (ref 44–72)
NRBC # BLD AUTO: 0 K/UL
NRBC BLD AUTO-RTO: 0 /100 WBC
PLATELET # BLD AUTO: 185 K/UL (ref 164–446)
PMV BLD AUTO: 9.5 FL (ref 9–12.9)
POTASSIUM SERPL-SCNC: 4.1 MMOL/L (ref 3.6–5.5)
RBC # BLD AUTO: 3.21 M/UL (ref 4.7–6.1)
SODIUM SERPL-SCNC: 134 MMOL/L (ref 135–145)
WBC # BLD AUTO: 11.9 K/UL (ref 4.8–10.8)

## 2017-10-27 PROCEDURE — 700102 HCHG RX REV CODE 250 W/ 637 OVERRIDE(OP): Performed by: SURGERY

## 2017-10-27 PROCEDURE — A9270 NON-COVERED ITEM OR SERVICE: HCPCS | Performed by: SURGERY

## 2017-10-27 PROCEDURE — 83735 ASSAY OF MAGNESIUM: CPT

## 2017-10-27 PROCEDURE — 700101 HCHG RX REV CODE 250: Performed by: SURGERY

## 2017-10-27 PROCEDURE — 80048 BASIC METABOLIC PNL TOTAL CA: CPT

## 2017-10-27 PROCEDURE — 36415 COLL VENOUS BLD VENIPUNCTURE: CPT

## 2017-10-27 PROCEDURE — 700112 HCHG RX REV CODE 229: Performed by: SURGERY

## 2017-10-27 PROCEDURE — 85025 COMPLETE CBC W/AUTO DIFF WBC: CPT

## 2017-10-27 PROCEDURE — 700111 HCHG RX REV CODE 636 W/ 250 OVERRIDE (IP): Performed by: SURGERY

## 2017-10-27 PROCEDURE — 770006 HCHG ROOM/CARE - MED/SURG/GYN SEMI*

## 2017-10-27 RX ADMIN — ACETAMINOPHEN 650 MG: 325 TABLET, FILM COATED ORAL at 18:25

## 2017-10-27 RX ADMIN — OXYBUTYNIN CHLORIDE 5 MG: 5 TABLET ORAL at 19:43

## 2017-10-27 RX ADMIN — OMEPRAZOLE 20 MG: 20 CAPSULE, DELAYED RELEASE ORAL at 19:44

## 2017-10-27 RX ADMIN — TAMSULOSIN HYDROCHLORIDE 0.4 MG: 0.4 CAPSULE ORAL at 19:44

## 2017-10-27 RX ADMIN — ACETAMINOPHEN 650 MG: 325 TABLET, FILM COATED ORAL at 12:44

## 2017-10-27 RX ADMIN — ACETAMINOPHEN 650 MG: 325 TABLET, FILM COATED ORAL at 04:23

## 2017-10-27 RX ADMIN — ENOXAPARIN SODIUM 40 MG: 100 INJECTION SUBCUTANEOUS at 09:24

## 2017-10-27 RX ADMIN — CELECOXIB 200 MG: 200 CAPSULE ORAL at 18:26

## 2017-10-27 RX ADMIN — OXYCODONE HYDROCHLORIDE 10 MG: 10 TABLET ORAL at 12:45

## 2017-10-27 RX ADMIN — ASPIRIN 81 MG: 81 TABLET, COATED ORAL at 19:43

## 2017-10-27 RX ADMIN — SIMVASTATIN 40 MG: 40 TABLET, FILM COATED ORAL at 19:44

## 2017-10-27 RX ADMIN — DOCUSATE SODIUM 100 MG: 100 CAPSULE ORAL at 16:31

## 2017-10-27 RX ADMIN — OXYCODONE HYDROCHLORIDE 10 MG: 10 TABLET ORAL at 19:42

## 2017-10-27 RX ADMIN — OXYCODONE HYDROCHLORIDE 10 MG: 10 TABLET ORAL at 22:47

## 2017-10-27 RX ADMIN — ACETAMINOPHEN 650 MG: 325 TABLET, FILM COATED ORAL at 23:43

## 2017-10-27 RX ADMIN — OXYCODONE HYDROCHLORIDE 10 MG: 10 TABLET ORAL at 16:30

## 2017-10-27 RX ADMIN — NICOTINE 21 MG: 21 PATCH, EXTENDED RELEASE TRANSDERMAL at 04:29

## 2017-10-27 RX ADMIN — OXYCODONE HYDROCHLORIDE 10 MG: 10 TABLET ORAL at 09:28

## 2017-10-27 RX ADMIN — SERTRALINE 100 MG: 100 TABLET, FILM COATED ORAL at 19:44

## 2017-10-27 RX ADMIN — POTASSIUM CHLORIDE, DEXTROSE MONOHYDRATE AND SODIUM CHLORIDE: 150; 5; 450 INJECTION, SOLUTION INTRAVENOUS at 16:31

## 2017-10-27 RX ADMIN — DOCUSATE SODIUM 100 MG: 100 CAPSULE ORAL at 19:43

## 2017-10-27 RX ADMIN — POTASSIUM CHLORIDE, DEXTROSE MONOHYDRATE AND SODIUM CHLORIDE: 150; 5; 450 INJECTION, SOLUTION INTRAVENOUS at 04:21

## 2017-10-27 RX ADMIN — KETOROLAC TROMETHAMINE 15 MG: 30 INJECTION, SOLUTION INTRAMUSCULAR at 04:29

## 2017-10-27 ASSESSMENT — PAIN SCALES - GENERAL
PAINLEVEL_OUTOF10: 4
PAINLEVEL_OUTOF10: 10
PAINLEVEL_OUTOF10: 3
PAINLEVEL_OUTOF10: 4
PAINLEVEL_OUTOF10: 5

## 2017-10-27 NOTE — PROGRESS NOTES
Surgery    Minimal pain  Tolerating PO  Hasn't ambulated yet    AFVSS    Abd SND mild TTP  Incision CDI with Prevena wound vac    A/P)  10/26/17: Left femoral endarterectomy  Doing well  Diet as tolerated  Ambulate as tolerated  PT    Home when functional status improves. Likely 2-4 more days in hospital.    Nicolas Marie MD  General and Vascular Surgery  [facility]  Cell: [phone]

## 2017-10-27 NOTE — PROGRESS NOTES
Received handoff report from MINDY Berger  Pt resting comfortably.  Reviewed labs, orders, medications

## 2017-10-27 NOTE — PROGRESS NOTES
VSS  A&O x 4  Wound vac to left groin. CDI  No leaks at this time  Denies pain at this time  Denies nausea or vomiting  Pulse on left leg palpable however it is faint  Leg is pale but warm  Denies numbness or tingling  POC discussed   No further needs at this time

## 2017-10-27 NOTE — PROGRESS NOTES
Received bedside report and assumed care of patient.  Assessment complete; discussed POC with patient.  AO x4, patient calls for assistance.  Patient appears calm and exhibits no signs of distress.  Patient reports pain of 4/10, medicating with PO oxycodone IR.  Patient tolerating current diet.  BS normoactive x 4, LBM PTA, patient voids using bedside urinal.  Patient is 1x assist, gait not yet attempted.  Wound vac to left groin, no leaks seen.  LLE pulse is faint 1+  Call light within reach, bed in lowest position, SCDs in use, bed alarm in use.  Will continue to monitor pt for changes in status and provide care.

## 2017-10-27 NOTE — CARE PLAN
Problem: Infection  Goal: Will remain free from infection  Outcome: PROGRESSING AS EXPECTED  Reminded patient of the signs and symptoms of infection and encouraged patient to report any of these findings in order to promote best outcomes.    Problem: Pain Management  Goal: Pain level will decrease to patient's comfort goal  Outcome: PROGRESSING AS EXPECTED  Assessed pain and medicated per MAR.  Reminded patient to report any changes in severity or quality of pain in order to best manage pain.

## 2017-10-27 NOTE — CARE PLAN
Problem: Safety  Goal: Will remain free from injury  Outcome: PROGRESSING AS EXPECTED  Call light within reach, bed locked and in lowest position    Problem: Venous Thromboembolism (VTW)/Deep Vein Thrombosis (DVT) Prevention:  Goal: Patient will participate in Venous Thrombosis (VTE)/Deep Vein Thrombosis (DVT)Prevention Measures    Intervention: Assess and monitor for anticoagulation complications  Wound vac in place to left groin, CDI, assessed peripheral pulse and continue to monitor for warmth and perfusion.

## 2017-10-28 PROCEDURE — A9270 NON-COVERED ITEM OR SERVICE: HCPCS | Performed by: SURGERY

## 2017-10-28 PROCEDURE — 770006 HCHG ROOM/CARE - MED/SURG/GYN SEMI*

## 2017-10-28 PROCEDURE — 700111 HCHG RX REV CODE 636 W/ 250 OVERRIDE (IP): Performed by: SURGERY

## 2017-10-28 PROCEDURE — 700101 HCHG RX REV CODE 250: Performed by: SURGERY

## 2017-10-28 PROCEDURE — 700112 HCHG RX REV CODE 229: Performed by: SURGERY

## 2017-10-28 PROCEDURE — 700102 HCHG RX REV CODE 250 W/ 637 OVERRIDE(OP): Performed by: SURGERY

## 2017-10-28 RX ADMIN — TAMSULOSIN HYDROCHLORIDE 0.4 MG: 0.4 CAPSULE ORAL at 20:15

## 2017-10-28 RX ADMIN — OXYCODONE HYDROCHLORIDE 10 MG: 10 TABLET ORAL at 18:58

## 2017-10-28 RX ADMIN — ASPIRIN 81 MG: 81 TABLET, COATED ORAL at 20:15

## 2017-10-28 RX ADMIN — ENOXAPARIN SODIUM 40 MG: 100 INJECTION SUBCUTANEOUS at 09:09

## 2017-10-28 RX ADMIN — OXYCODONE HYDROCHLORIDE 10 MG: 10 TABLET ORAL at 04:49

## 2017-10-28 RX ADMIN — BENAZEPRIL HYDROCHLORIDE 10 MG: 20 TABLET ORAL at 20:15

## 2017-10-28 RX ADMIN — DESONIDE: 0.5 CREAM TOPICAL at 18:04

## 2017-10-28 RX ADMIN — CELECOXIB 200 MG: 200 CAPSULE ORAL at 18:03

## 2017-10-28 RX ADMIN — NICOTINE 21 MG: 21 PATCH, EXTENDED RELEASE TRANSDERMAL at 05:37

## 2017-10-28 RX ADMIN — OXYBUTYNIN CHLORIDE 5 MG: 5 TABLET ORAL at 20:14

## 2017-10-28 RX ADMIN — OXYCODONE HYDROCHLORIDE 10 MG: 10 TABLET ORAL at 15:31

## 2017-10-28 RX ADMIN — ACETAMINOPHEN 650 MG: 325 TABLET, FILM COATED ORAL at 18:03

## 2017-10-28 RX ADMIN — ACETAMINOPHEN 650 MG: 325 TABLET, FILM COATED ORAL at 05:37

## 2017-10-28 RX ADMIN — OXYCODONE HYDROCHLORIDE 10 MG: 10 TABLET ORAL at 01:50

## 2017-10-28 RX ADMIN — OMEPRAZOLE 20 MG: 20 CAPSULE, DELAYED RELEASE ORAL at 20:15

## 2017-10-28 RX ADMIN — DOCUSATE SODIUM 100 MG: 100 CAPSULE ORAL at 20:15

## 2017-10-28 RX ADMIN — DOCUSATE SODIUM 100 MG: 100 CAPSULE ORAL at 09:10

## 2017-10-28 RX ADMIN — CELECOXIB 200 MG: 200 CAPSULE ORAL at 09:12

## 2017-10-28 RX ADMIN — ACETAMINOPHEN 650 MG: 325 TABLET, FILM COATED ORAL at 12:26

## 2017-10-28 RX ADMIN — SIMVASTATIN 40 MG: 40 TABLET, FILM COATED ORAL at 20:14

## 2017-10-28 RX ADMIN — SERTRALINE 100 MG: 100 TABLET, FILM COATED ORAL at 20:15

## 2017-10-28 RX ADMIN — OXYCODONE HYDROCHLORIDE 10 MG: 10 TABLET ORAL at 09:10

## 2017-10-28 RX ADMIN — POTASSIUM CHLORIDE, DEXTROSE MONOHYDRATE AND SODIUM CHLORIDE: 150; 5; 450 INJECTION, SOLUTION INTRAVENOUS at 18:57

## 2017-10-28 RX ADMIN — OXYCODONE HYDROCHLORIDE 10 MG: 10 TABLET ORAL at 12:26

## 2017-10-28 RX ADMIN — ACETAMINOPHEN 650 MG: 325 TABLET, FILM COATED ORAL at 23:39

## 2017-10-28 RX ADMIN — OXYCODONE HYDROCHLORIDE 5 MG: 5 TABLET ORAL at 23:39

## 2017-10-28 RX ADMIN — POTASSIUM CHLORIDE, DEXTROSE MONOHYDRATE AND SODIUM CHLORIDE: 150; 5; 450 INJECTION, SOLUTION INTRAVENOUS at 05:41

## 2017-10-28 RX ADMIN — HYDROCHLOROTHIAZIDE 25 MG: 25 TABLET ORAL at 20:15

## 2017-10-28 ASSESSMENT — PAIN SCALES - GENERAL
PAINLEVEL_OUTOF10: 7
PAINLEVEL_OUTOF10: 3
PAINLEVEL_OUTOF10: 6
PAINLEVEL_OUTOF10: 0
PAINLEVEL_OUTOF10: 5
PAINLEVEL_OUTOF10: 5

## 2017-10-28 NOTE — CARE PLAN
Problem: Infection  Goal: Will remain free from infection  Outcome: PROGRESSING AS EXPECTED  Reminded patient of the signs and symptoms of infection and encouraged patient to report any of these findings in order to promote best outcomes.    Problem: Pain Management  Goal: Pain level will decrease to patient's comfort goal  Outcome: PROGRESSING SLOWER THAN EXPECTED  Assessed pain and medicated per MAR.  Reminded patient to report any changes in severity or quality of pain in order to best manage pain.

## 2017-10-28 NOTE — CARE PLAN
Problem: Safety  Goal: Will remain free from injury  Outcome: PROGRESSING AS EXPECTED  Educated on use of the bed alarm, patient continues to refuse.  Call light is within reach and bed is locked and in the lowest position.    Problem: Pain Management  Goal: Pain level will decrease to patient's comfort goal  Outcome: PROGRESSING AS EXPECTED  Medicated per MAR, patient has requested to be medicated Q3 hours.

## 2017-10-28 NOTE — PROGRESS NOTES
Patient educated on use of the bed alarm, continues to refuse despite education.  Call light is within reach and bed is locked and in the lowest position.

## 2017-10-28 NOTE — PROGRESS NOTES
Assumed care of patient. Received report from Jyothi Burnham. Patient resting comfortably. Call light within reach.

## 2017-10-28 NOTE — PROGRESS NOTES
"Assumed care of patient.  He is alert and oriented times 4, stating pain of 10/10, medicated per MAR.  PIV to the L forearm, patent and running D5 1/2NS with 20K at 75mL/hr.  Wound vac to the left groin, CDI  SCDs in place.  Patient is a one person assist with a FWW.  VSS /59   Pulse 89   Temp 36.4 °C (97.5 °F)   Resp 18   Ht 1.803 m (5' 11\")   Wt 73.7 kg (162 lb 7.7 oz)   SpO2 96%   BMI 22.66 kg/m²   Blood pressure medications held at this time because of the low blood pressure.  Bed is locked and in the lowest position, call light is within reach.  All needs are met at this time, hourly rounding is in place.  "

## 2017-10-28 NOTE — PROGRESS NOTES
Received bedside report and assumed care of patient.  Assessment complete; discussed POC with patient.  AO x4, patient calls for assistance.  Patient appears calm and exhibits no signs of distress.  Patient reports pain of 7/10, medicating per MAR PRN with oxycodone PO 10mg.  Patient tolerating current diet.  Pedal pulses 1+ to bilaterally.  BS normoactive x 4, LBM PTA 10/26/17, +flatus, patient voids with bedside urinal or ambulating to BR.  Patient is 1x assist with FWW, gait unsteady.  Call light within reach, bed in lowest position, SCDs in use, bed alarm refused.  Left groin prevena wound vac in place, no leaks present.  Will continue to monitor pt for changes in status and provide care.

## 2017-10-29 VITALS
TEMPERATURE: 96.6 F | SYSTOLIC BLOOD PRESSURE: 135 MMHG | BODY MASS INDEX: 22.75 KG/M2 | OXYGEN SATURATION: 96 % | HEART RATE: 58 BPM | WEIGHT: 162.48 LBS | DIASTOLIC BLOOD PRESSURE: 79 MMHG | HEIGHT: 71 IN | RESPIRATION RATE: 18 BRPM

## 2017-10-29 PROBLEM — I70.212 ATHEROSCLEROSIS OF NATIVE ARTERY OF LEFT LOWER EXTREMITY WITH INTERMITTENT CLAUDICATION (HCC): Status: RESOLVED | Noted: 2017-10-26 | Resolved: 2017-10-29

## 2017-10-29 PROCEDURE — A9270 NON-COVERED ITEM OR SERVICE: HCPCS | Performed by: SURGERY

## 2017-10-29 PROCEDURE — 700101 HCHG RX REV CODE 250: Performed by: SURGERY

## 2017-10-29 PROCEDURE — 700111 HCHG RX REV CODE 636 W/ 250 OVERRIDE (IP): Performed by: SURGERY

## 2017-10-29 PROCEDURE — 700112 HCHG RX REV CODE 229: Performed by: SURGERY

## 2017-10-29 PROCEDURE — 700102 HCHG RX REV CODE 250 W/ 637 OVERRIDE(OP): Performed by: SURGERY

## 2017-10-29 RX ORDER — HYDROCODONE BITARTRATE AND ACETAMINOPHEN 5; 325 MG/1; MG/1
1-2 TABLET ORAL EVERY 4 HOURS PRN
Qty: 20 TAB | Refills: 0 | Status: ON HOLD | OUTPATIENT
Start: 2017-10-29 | End: 2018-08-02

## 2017-10-29 RX ORDER — LACTULOSE 20 G/30ML
30 SOLUTION ORAL 3 TIMES DAILY
Status: DISCONTINUED | OUTPATIENT
Start: 2017-10-29 | End: 2017-10-29

## 2017-10-29 RX ORDER — DOCUSATE SODIUM 100 MG/1
100 CAPSULE, LIQUID FILLED ORAL 2 TIMES DAILY
Qty: 30 CAP | Refills: 3 | Status: ON HOLD | OUTPATIENT
Start: 2017-10-29 | End: 2018-08-02

## 2017-10-29 RX ORDER — POLYETHYLENE GLYCOL 3350 17 G/17G
1 POWDER, FOR SOLUTION ORAL DAILY
Status: DISCONTINUED | OUTPATIENT
Start: 2017-10-29 | End: 2017-10-29 | Stop reason: HOSPADM

## 2017-10-29 RX ORDER — LACTULOSE 20 G/30ML
30 SOLUTION ORAL
Status: DISCONTINUED | OUTPATIENT
Start: 2017-10-29 | End: 2017-10-29

## 2017-10-29 RX ORDER — BISACODYL 10 MG
10 SUPPOSITORY, RECTAL RECTAL DAILY
Status: DISCONTINUED | OUTPATIENT
Start: 2017-10-29 | End: 2017-10-29 | Stop reason: HOSPADM

## 2017-10-29 RX ORDER — AMOXICILLIN 250 MG
2 CAPSULE ORAL 2 TIMES DAILY
Status: DISCONTINUED | OUTPATIENT
Start: 2017-10-29 | End: 2017-10-29 | Stop reason: HOSPADM

## 2017-10-29 RX ADMIN — OXYCODONE HYDROCHLORIDE 5 MG: 5 TABLET ORAL at 02:51

## 2017-10-29 RX ADMIN — OXYCODONE HYDROCHLORIDE 5 MG: 5 TABLET ORAL at 07:56

## 2017-10-29 RX ADMIN — ENOXAPARIN SODIUM 40 MG: 100 INJECTION SUBCUTANEOUS at 07:57

## 2017-10-29 RX ADMIN — STANDARDIZED SENNA CONCENTRATE AND DOCUSATE SODIUM 2 TABLET: 8.6; 5 TABLET, FILM COATED ORAL at 10:56

## 2017-10-29 RX ADMIN — DESONIDE: 0.5 CREAM TOPICAL at 15:12

## 2017-10-29 RX ADMIN — OXYCODONE HYDROCHLORIDE 5 MG: 5 TABLET ORAL at 16:14

## 2017-10-29 RX ADMIN — NICOTINE 21 MG: 21 PATCH, EXTENDED RELEASE TRANSDERMAL at 05:16

## 2017-10-29 RX ADMIN — POTASSIUM CHLORIDE, DEXTROSE MONOHYDRATE AND SODIUM CHLORIDE: 150; 5; 450 INJECTION, SOLUTION INTRAVENOUS at 05:16

## 2017-10-29 RX ADMIN — CELECOXIB 200 MG: 200 CAPSULE ORAL at 07:57

## 2017-10-29 RX ADMIN — LACTULOSE 30 ML: 20 SOLUTION ORAL at 10:56

## 2017-10-29 RX ADMIN — METHYLNALTREXONE BROMIDE 12 MG: 12 INJECTION, SOLUTION SUBCUTANEOUS at 11:46

## 2017-10-29 RX ADMIN — ACETAMINOPHEN 650 MG: 325 TABLET, FILM COATED ORAL at 05:16

## 2017-10-29 RX ADMIN — DOCUSATE SODIUM 100 MG: 100 CAPSULE ORAL at 07:56

## 2017-10-29 RX ADMIN — ACETAMINOPHEN 650 MG: 325 TABLET, FILM COATED ORAL at 11:53

## 2017-10-29 RX ADMIN — OXYCODONE HYDROCHLORIDE 10 MG: 10 TABLET ORAL at 12:24

## 2017-10-29 RX ADMIN — MAGNESIUM HYDROXIDE 30 ML: 400 SUSPENSION ORAL at 10:56

## 2017-10-29 ASSESSMENT — PAIN SCALES - GENERAL
PAINLEVEL_OUTOF10: 4
PAINLEVEL_OUTOF10: 3
PAINLEVEL_OUTOF10: 5
PAINLEVEL_OUTOF10: 7
PAINLEVEL_OUTOF10: ASSUMED PAIN PRESENT
PAINLEVEL_OUTOF10: 3
PAINLEVEL_OUTOF10: 3

## 2017-10-29 NOTE — PROGRESS NOTES
Pt A&Ox4, sitting up in bed. Tolerating diet, denies n/v. C/o pain 4/10 to left groin, medicated per MAR.  Bilateral pedal pulses 1+, feet are warm. Wound vac noted to left groin with no output. LBM PTA, +BS, +flatus. Encouraged ambulation, offered prune juice. Bed locked in lowest position, call light within reach, hourly rounding in place. Calls appropriately for assistance. Pt expressing wanting to go home today, will discuss with MD.

## 2017-10-29 NOTE — DISCHARGE INSTRUCTIONS
Discharge Instructions    Discharged to home by car with relative. Discharged via wheelchair, hospital escort: Yes.  Special equipment needed: Not Applicable    Be sure to schedule a follow-up appointment with your primary care doctor or any specialists as instructed.     Discharge Plan:   Diet Plan: Discussed  Activity Level: Discussed  Smoking Cessation Offered: Patient Refused  Confirmed Follow up Appointment: Patient to Call and Schedule Appointment  Confirmed Symptoms Management: Discussed  Medication Reconciliation Updated: Yes  Influenza Vaccine Indication: Indicated: 65 years and older    I understand that a diet low in cholesterol, fat, and sodium is recommended for good health. Unless I have been given specific instructions below for another diet, I accept this instruction as my diet prescription.   Other diet: Regular    Special Instructions: None    · Is patient discharged on Warfarin / Coumadin?   No     · Is patient Post Blood Transfusion?  No    Depression / Suicide Risk    As you are discharged from this [facility] facility, it is important to learn how to keep safe from harming yourself.    Recognize the warning signs:  · Abrupt changes in personality, positive or negative- including increase in energy   · Giving away possessions  · Change in eating patterns- significant weight changes-  positive or negative  · Change in sleeping patterns- unable to sleep or sleeping all the time   · Unwillingness or inability to communicate  · Depression  · Unusual sadness, discouragement and loneliness  · Talk of wanting to die  · Neglect of personal appearance   · Rebelliousness- reckless behavior  · Withdrawal from people/activities they love  · Confusion- inability to concentrate     If you or a loved one observes any of these behaviors or has concerns about self-harm, here's what you can do:  · Talk about it- your feelings and reasons for harming yourself  · Remove any means that you might use to hurt  yourself (examples: pills, rope, extension cords, firearm)  · Get professional help from the community (Mental Health, Substance Abuse, psychological counseling)  · Do not be alone:Call your Safe Contact- someone whom you trust who will be there for you.  · Call your local CRISIS HOTLINE 554-2932 or 141-279-9814  · Call your local Children's Mobile Crisis Response Team [place] (668) 376-8772 or www.[employer]  · Call the toll free National Suicide Prevention Hotlines   · National Suicide Prevention Lifeline 837-322-EZDI (5127)  · National Hope Line Network 800-SUICIDE (989-0558)    Femoral Endarterectomy, Care After  Refer to this sheet in the next few weeks. These discharge instructions provide you with general information on caring for yourself after you leave the hospital. Your caregiver may also give you specific instructions. Your treatment has been planned according to the most current medical practices available, but unavoidable complications sometimes occur. If you have any problems or questions after discharge, please call your caregiver.  HOME CARE INSTRUCTIONS   · Medicine.  ¨ Some pain is normal after this type of surgery. Take the pain medicine that was prescribed. Follow the directions carefully. Do not take over-the-counter pain medicine unless your caregiver says it is okay. Some of them can cause bleeding problems after surgery.  ¨ You might need to take a blood thinner (anticoagulant). This keeps blood clots from forming. Follow the directions carefully.  · Wound care.  ¨ Keep the bandage (dressing) on your surgical cut (incision) dry for a few days after surgery. Once the dressing can be taken off, it will be okay for you to shower. Do not take a tub bath for at least 2 weeks after surgery.  ¨ You will need to return to have your stitches (sutures) or staples removed. This is usually done about a week after your surgery.  · Activity.  ¨ It is important to walk as much as possible. This  helps keep blood clots from forming in your legs.  ¨ Ask your caregiver before going up or down steps. Even after your caregiver says it is okay to use stairs, you may need help with steps for awhile.  ¨ Do not sit or stand for long periods of time. When you do sit, keep your legs raised. Put your feet on a stool or lie on the couch.  ¨ Do not lift anything heavy for several weeks.  ¨ Do not bend or strain for several weeks.  ¨ Do not drive until your caregiver says it is okay. Do not drive while taking narcotic pain medicine.  ¨ Ask your caregiver when you can go back to work. This will depend on the type of work you do.  ¨ Go back to your normal routine slowly. Give your body time to heal. Ask your caregiver if you have questions about any particular activity.  · Lifestyle.  ¨ You might need to change some habits to make sure your arteries stay clear.  ¨ Talk with a nutritionist about what you eat. You may need to eat less of some types of food. Good foods are those low in saturated fats. Vegetables, fruits, and whole grains are good for you.  ¨ Think about exercising more. Check with your caregiver before starting a new exercise plan.  ¨ Keep your weight under control.  ¨ Do not smoke.  SEEK MEDICAL CARE IF:   · You have any questions about medicines.  · Pain continues, even after taking pain medicine.  · You have pain or cramps in your leg while walking.  · You have an oral temperature above 102° F (38.9° C).  SEEK IMMEDIATE MEDICAL CARE IF:   · Pain gets much worse.  · You have shortness of breath.  · You have chest pain.  · The area around the incision becomes red and swells.  · Blood or other liquid leaks from the incision.  · Your leg becomes red, swollen, or sore.  · Your leg or foot changes color or becomes numb.  · You have an oral temperature above 102° F (38.9° C), not controlled by medicine.  MAKE SURE YOU:   · Understand these instructions.  · Will watch your condition.  · Will get help right away if  you are not doing well or get worse.     This information is not intended to replace advice given to you by your health care provider. Make sure you discuss any questions you have with your health care provider.     Document Released: 03/14/2011 Document Revised: 10/08/2014 Document Reviewed: 03/14/2011  [employer] Interactive Patient Education ©2016 [employer]    Procedure: Lower Extremity Revascularization     1. ACTIVITIES: No strenuous activities or heavy lifting (greater than 15 pounds) for 3 weeks.     2. DRIVING: You may drive whenever you are off pain medications and are able to perform the activities needed to drive, i.e. turning, bending, twisting, etc.      3. WOUND: It is not unusual for patients to experience swelling and even bruising, as well as a small amount of drainage from the incision. This is normal and will resolve over the next 1-2 weeks. You can shower starting the day of discharge. You only need to cover the incision if there is drainage from the incision.  You have a wound vac covering your incision: the batteries will die in 2-3 days, then you can remove and through the purple bandage/tubing/pump in the garbage.     4. BATHING: You can shower starting the day of discharge.     6. PAIN MEDICATION: You will be given a prescription for pain medication at discharge. Please take these as directed. It is important to remember not to take medications on an empty stomach as this may cause nausea. Also, you may get a prescription for a stool softener which you should take as long as you are taking pain medication.     7. BOWEL FUNCTION: After surgery, it is not uncommon for patients to experience constipation. This is due to decreasing activity levels as well as pain medications. You may need to use a laxative (Milk of Magnesia, Ex-lax; Senokot, etc.) if you go more than 1-2 days without a bowel movement.     8 .CALL IF YOU HAVE: (1) Fevers to more than 101.5 F, (2) Unusual or excessive pain, (3)  Drainage or fluid from incision that may be foul smelling, increased tenderness or soreness at the wound or the wound edges are no longer together, redness or swelling at the incision site. Please do not hesitate to call with any other questions.      If you have any additional questions, please do not hesitate to call the office and speak to either myself or the physician on call.      Office addresses:   [address] [place] [address] [address]  [place] [address]  [phone]     Nicolas Marie MD  [facility]  [phone]

## 2017-10-29 NOTE — CARE PLAN
Problem: Safety  Goal: Will remain free from injury  Reminded pt to call for assistance at all time when getting OOB d/t bed alarm refusal, bed on lowest position, call light within reach.    Problem: Pain Management  Goal: Pain level will decrease to patient's comfort goal  Pain assessed, medicated per MAR.

## 2017-10-29 NOTE — PROGRESS NOTES
Discharging Patient home per physician order.  Discharged with spouse.  Demonstrated understanding of discharge instructions, follow up appointments, home medications, prescriptions, home care for surgical wound, and nursing care instructions for femoral enterectomy.  Ambulating without assistance, voiding without difficulty, pain well controlled, tolerating oral medications, oxygen saturation greater than 90% on RA, tolerating diet. Educational handouts  given and discussed.  Verbalized understanding of discharge instructions and educational handouts.  All questions answered.  Patient able to state several reasons why to return to the ED or seek medical attention. Belongings with patient at time of discharge.

## 2017-10-29 NOTE — PROGRESS NOTES
Discharge Instructions    Procedure: Lower Extremity Revascularization    1. ACTIVITIES: No strenuous activities or heavy lifting (greater than 15 pounds) for 3 weeks.    2. DRIVING: You may drive whenever you are off pain medications and are able to perform the activities needed to drive, i.e. turning, bending, twisting, etc.     3. WOUND: It is not unusual for patients to experience swelling and even bruising, as well as a small amount of drainage from the incision. This is normal and will resolve over the next 1-2 weeks. You can shower starting the day of discharge. You only need to cover the incision if there is drainage from the incision.  You have a wound vac covering your incision: the batteries will die in 2-3 days, then you can remove and through the purple bandage/tubing/pump in the garbage.    4. BATHING: You can shower starting the day of discharge.    6. PAIN MEDICATION: You will be given a prescription for pain medication at discharge. Please take these as directed. It is important to remember not to take medications on an empty stomach as this may cause nausea. Also, you may get a prescription for a stool softener which you should take as long as you are taking pain medication.    7. BOWEL FUNCTION: After surgery, it is not uncommon for patients to experience constipation. This is due to decreasing activity levels as well as pain medications. You may need to use a laxative (Milk of Magnesia, Ex-lax; Senokot, etc.) if you go more than 1-2 days without a bowel movement.    8 .CALL IF YOU HAVE: (1) Fevers to more than 101.5 F, (2) Unusual or excessive pain, (3) Drainage or fluid from incision that may be foul smelling, increased tenderness or soreness at the wound or the wound edges are no longer together, redness or swelling at the incision site. Please do not hesitate to call with any other questions.     If you have any additional questions, please do not hesitate to call the office and speak to  either myself or the physician on call.     Office addresses:   [address] [place] [address] [address]  [facility] [address]  [phone]    Nicolas Marie MD  [facility]  [phone]

## 2017-10-29 NOTE — PROGRESS NOTES
Surgery    Minimal pain  Tolerating PO  Ambulating OK    AFVSS    Abd SND mild TTP  Incision CDI with Prevena wound vac    A/P)  10/26/17: Left femoral endarterectomy  Doing well  Diet as tolerated  Ambulate as tolerated  PT    Home when functional status improves. Likely home today or tomorrow.    Nicolas Marie MD  General and Vascular Surgery  [facility]  Cell: [phone]

## 2017-10-29 NOTE — PROGRESS NOTES
Received pt resting well in bed/stable with no apparent distress, no AMS/A&Ox4, VSS. Denied pain on initial encounter after pain meds given on shift change, no PRN needed at this time. Breathing even/unlabored, noted dim lung sounds, on RA/denied SOB, sats at mid-90s. Abdomen soft/nontender, (+)hyperBSx4, per pt last BM was on the 25, able to pass flatus, encouraged pt to ambulate frequently/increase clear fluid intake to hasten GI motility, will given prune juice in the AM, able to pass flatus, no c/o N/V, tolerating current diet well, reminded pt regarding narc use and s/e, primarily constipation. PIV intact/patent x IVF at 75. Prevena to left groin intact, negative pressure maintained. LLE warm to touch, positive pedal pulse/faint. SBA with FWW on ambulation. Refused bed alarm placement despite edu. No concerns. POC discussed.

## 2017-10-29 NOTE — CARE PLAN
Problem: Bowel/Gastric:  Goal: Normal bowel function is maintained or improved  Outcome: PROGRESSING AS EXPECTED  BM today    Problem: Pain Management  Goal: Pain level will decrease to patient's comfort goal  Outcome: PROGRESSING AS EXPECTED  Well controlled on PO medications PRN    Problem: Respiratory:  Goal: Respiratory status will improve  Outcome: PROGRESSING AS EXPECTED  Saturating >90% on RA    Problem: Mobility  Goal: Risk for activity intolerance will decrease  Outcome: PROGRESSING AS EXPECTED  Ambulating in halls 1x FWW, steady

## 2017-11-22 NOTE — OP REPORT
DATE OF SERVICE:  10/26/2017    PREOPERATIVE DIAGNOSES:  1.  Left lower extremity arterial insufficiency with rest pain.  2.  Right lower extremity arterial insufficiency with life-limiting   claudication.  3.  Hypertension.  4.  Hyperlipidemia.  5.  Emphysema and chronic obstructive pulmonary disease.  6.  Tobacco use.    POSTOPERATIVE DIAGNOSES:  1.  Left lower extremity arterial insufficiency with rest pain.  2.  Right lower extremity arterial insufficiency with life-limiting   claudication.  3.  Hypertension.  4.  Hyperlipidemia.  5.  Emphysema and chronic obstructive pulmonary disease.  6.  Tobacco use.    PROCEDURES:  1.  Endarterectomy of the left common femoral artery, superficial femoral   artery, and profunda femoris artery with bovine pericardial patch angioplasty.  2.  Nonselective catheterization of the aorta.  3.  Aortoiliac angiography.  4.  Selective catheter placement in the right common femoral artery.  5.  Runoff angiography of the right lower extremity.    SURGEON:  Nicolas Marie M.D.    ASSISTANT:  KOFI Monterroso    ANESTHESIA:  General.    BLOOD LOSS:  30 mL.    SPECIMENS:  None.    COMPLICATIONS:  None.    FINDINGS:  Aortoiliac angiography demonstrated no significant occlusive   disease.  The right lower extremity angiography demonstrated a short segment,   distal SFA stenosis that was near occlusive and measured approximately 2 cm in   length.  There appeared to be three-vessel runoff in the right lower   extremity.    DISPOSITION:  The patient was extubated and sent to recovery in stable   condition.    DESCRIPTION OF PROCEDURE:  Following informed consent, the patient was brought   to the operating suite, placed supine on the operating table and general   anesthesia was administered.  The patient's bilateral groins were prepped and   draped sterile surgical time-out was called and everyone was in agreement.    Patient received preoperative prophylactic  antibiotics.    DESCRIPTION OF THE PROCEDURE:  The procedure began with an oblique incision in   the left groin overlying the common femoral artery.  We then dissected down   through the subcutaneous tissue until the femoral sheath was encountered.  We   opened the femoral sheath vertically to expose the common femoral artery and   we then dissected out the entire common femoral artery, as well as the   proximal superficial femoral artery and the first 2 cm of the profunda femoris   artery.  We encircled the vessels proximally and distally with vessel loops.    The patient was then systemically heparinized and this was maintained   throughout the reconstructive portion of the procedure.    We applied proximal and distal clamps and then we opened the common femoral   artery longitudinally down onto the superficial femoral artery.  This exposed   the severe plaque growth within.  We performed endarterectomy of the common   femoral artery, as well as the origin of the superficial femoral artery and   the profunda femoris artery as well.  Once the endarterectomy was complete, we   copiously irrigated the artery and then we forward flushed and backbled all   of the clamp sites.  We then reconstructed the artery with a bovine   pericardial patch in a running 5-0 Prolene suture line.  The clamps were then   removed and the left leg was allowed to reperfuse and there was found to be   good flow through the superficial femoral artery and profunda femoris artery   at this point.    At this point, we directly accessed the bovine patch with a micro access   needle and we dilated up to a 4-Danish sheath.  We advanced the Glidewire and   Omni flush catheter into the infrarenal aorta and we performed an aortoiliac   angiography.  We then advanced the catheter over the bifurcation into the   right common femoral artery and we will perform runoff angiography of the   right lower extremity.  This demonstrated a short segment near  occlusion in   the distal superficial femoral artery.  There were no other hemodynamically   significant stenoses visualized.  An attempt was made to access the right   superficial femoral artery origin.  However, the wire would not pass into the   superficial femoral artery and it could not be selected to allow for   intervention, despite several attempts.  Therefore, we removed the wire,   catheter, and sheath, and we closed with bovine patch with a 5-0 Prolene   suture.  We applied topical hemostatic to the left groin wound and we reversed   the heparin with protamine.  We then closed the incision with multiple layers   of absorbable suture, followed by staples for the skin.  We then protected   the incision with a Prevena wound VAC, which was connected to suction and   found to have a good seal.  The patient tolerated the procedure well.  All   counts were correct.  He was extubated and sent to recovery in stable   condition.       ____________________________________     MD KHADIJAH Celaya / PHILIP    DD:  11/21/2017 19:07:54  DT:  11/21/2017 20:02:39    D#:  [id number]  Job#:  [id number]

## 2017-12-14 NOTE — DISCHARGE SUMMARY
DISCHARGE SUMMARY    Duane Stuart Wilkerson  [id number]  [id number]  _____________________________________    DATE OF ADMISSION: 10/26/2017    DATE OF DISCHARGE: 10/29/2017    ADMISSION DIAGNOSIS (ES):  Atherosclerosis of native artery of left lower extremity with intermittent claudication (CMS-HCC)    DISCHARGE DIAGNOSIS (ES):  Atherosclerosis of native artery of left lower extremity with intermittent claudication (CMS-HCC)    DISCHARGE CONDITION:  stable    CONSULTATIONS:  none    PROCEDURES:  10/26/2017  Left femoral endarterectomy, angiography    HOSPITAL COURSE:  Duane Stuart Wilkerson is a 72 y.o. male who presented for elective left lower extremity revascularization..    Patient underwent the above-mentioned procedure without complication and was admitted to [facility] postoperatively. His ambulation and pain improved over the ensuing days. Ultimately he had an uneventful recovery and was discharged intact on postoperative day #3.    MEDICATIONS:  Current Outpatient Prescriptions   Medication Sig Dispense Refill   • hydrocodone-acetaminophen (NORCO) 5-325 MG Tab per tablet Take 1-2 Tabs by mouth every four hours as needed (as needed for pain). 20 Tab 0   • docusate sodium (COLACE) 100 MG Cap Take 1 Cap by mouth 2 times a day. 30 Cap 3   • tamsulosin (FLOMAX) 0.4 MG capsule Take 0.4 mg by mouth every evening.     • desonide (TRIDESILON) 0.05 % Cream Apply  to affected area(s) 1 time daily as needed.     • aspirin EC (ECOTRIN) 81 MG Tablet Delayed Response Take 81 mg by mouth every bedtime.     • hydrochlorothiazide (HYDRODIURIL) 25 MG Tab Take 25 mg by mouth every bedtime.     • omeprazole (PRILOSEC) 20 MG delayed-release capsule Take 20 mg by mouth every bedtime.     • sertraline (ZOLOFT) 100 MG Tab Take 100 mg by mouth every bedtime.     • simvastatin (ZOCOR) 40 MG Tab Take 40 mg by mouth every bedtime.     • oxybutynin (DITROPAN) 5 MG Tab Take 5 mg by mouth every bedtime.     • benazepril (LOTENSIN) 10 MG Tab Take 10  mg by mouth every bedtime.     • vitamin D, Ergocalciferol, (DRISDOL) 92238 units Cap capsule Take 50,000 Units by mouth every Saturday. Every Saturday      • oxycodone, immediate release, (ROXICODONE) 10 MG immediate release tablet Take 1 Tab by mouth every four hours as needed. 30 Tab 0   • potassium chloride SA (K-DUR) 20 MEQ TBCR Take 2 Tabs by mouth 2 Times a Day. 60 Tab 0       FOLLOW-UP:  Please call my office at [phone] to make an appointment in 2 week(s)    DISCHARGE INSTRUCTIONS:  Resume preadmission diet.  Light activity for 4 weeks.  OK to shower and get incisions wet.  Call or go to ER if you have chest pain, shortness of breath, lightheadedness, stroke-like symptoms, fever, worsening pain, or any other concerns.    Nicolas Marie MD  General and Vascular Surgery  [facility]

## 2018-06-20 ENCOUNTER — HOSPITAL ENCOUNTER (OUTPATIENT)
Dept: RADIOLOGY | Facility: MEDICAL CENTER | Age: 73
End: 2018-06-20
Attending: SURGERY
Payer: MEDICARE

## 2018-06-20 DIAGNOSIS — I65.23 BILATERAL CAROTID ARTERY STENOSIS: ICD-10-CM

## 2018-06-20 PROCEDURE — 70496 CT ANGIOGRAPHY HEAD: CPT

## 2018-06-20 PROCEDURE — 700117 HCHG RX CONTRAST REV CODE 255: Performed by: SURGERY

## 2018-06-20 PROCEDURE — 70498 CT ANGIOGRAPHY NECK: CPT

## 2018-06-20 RX ADMIN — IOHEXOL 100 ML: 350 INJECTION, SOLUTION INTRAVENOUS at 10:13

## 2018-08-02 ENCOUNTER — HOSPITAL ENCOUNTER (INPATIENT)
Facility: MEDICAL CENTER | Age: 73
LOS: 1 days | DRG: 039 | End: 2018-08-03
Attending: SURGERY | Admitting: SURGERY
Payer: MEDICARE

## 2018-08-02 DIAGNOSIS — I65.21 STENOSIS OF RIGHT CAROTID ARTERY: Primary | ICD-10-CM

## 2018-08-02 DIAGNOSIS — G89.18 POSTOPERATIVE PAIN: ICD-10-CM

## 2018-08-02 LAB
ABO GROUP BLD: NORMAL
ABO GROUP BLD: NORMAL
ANION GAP SERPL CALC-SCNC: 10 MMOL/L (ref 0–11.9)
BLD GP AB SCN SERPL QL: NORMAL
BUN SERPL-MCNC: 16 MG/DL (ref 8–22)
CALCIUM SERPL-MCNC: 9.1 MG/DL (ref 8.5–10.5)
CHLORIDE SERPL-SCNC: 101 MMOL/L (ref 96–112)
CO2 SERPL-SCNC: 25 MMOL/L (ref 20–33)
CREAT SERPL-MCNC: 1.03 MG/DL (ref 0.5–1.4)
EKG IMPRESSION: NORMAL
ERYTHROCYTE [DISTWIDTH] IN BLOOD BY AUTOMATED COUNT: 74.7 FL (ref 35.9–50)
GLUCOSE SERPL-MCNC: 88 MG/DL (ref 65–99)
HCT VFR BLD AUTO: 41.3 % (ref 42–52)
HGB BLD-MCNC: 13.8 G/DL (ref 14–18)
MCH RBC QN AUTO: 32.2 PG (ref 27–33)
MCHC RBC AUTO-ENTMCNC: 33.4 G/DL (ref 33.7–35.3)
MCV RBC AUTO: 96.3 FL (ref 81.4–97.8)
PLATELET # BLD AUTO: 208 K/UL (ref 164–446)
PMV BLD AUTO: 9.2 FL (ref 9–12.9)
POTASSIUM SERPL-SCNC: 3.5 MMOL/L (ref 3.6–5.5)
RBC # BLD AUTO: 4.29 M/UL (ref 4.7–6.1)
RH BLD: NORMAL
RH BLD: NORMAL
SODIUM SERPL-SCNC: 136 MMOL/L (ref 135–145)
WBC # BLD AUTO: 7.1 K/UL (ref 4.8–10.8)

## 2018-08-02 PROCEDURE — 160002 HCHG RECOVERY MINUTES (STAT): Performed by: SURGERY

## 2018-08-02 PROCEDURE — 700111 HCHG RX REV CODE 636 W/ 250 OVERRIDE (IP): Mod: JG

## 2018-08-02 PROCEDURE — 95938 SOMATOSENSORY TESTING: CPT | Performed by: SURGERY

## 2018-08-02 PROCEDURE — 500700 HCHG HEMOCLIP, SMALL (RED): Performed by: SURGERY

## 2018-08-02 PROCEDURE — A9270 NON-COVERED ITEM OR SERVICE: HCPCS

## 2018-08-02 PROCEDURE — 700101 HCHG RX REV CODE 250

## 2018-08-02 PROCEDURE — 86900 BLOOD TYPING SEROLOGIC ABO: CPT

## 2018-08-02 PROCEDURE — 4A11X4G MONITORING OF PERIPHERAL NERVOUS ELECTRICAL ACTIVITY, INTRAOPERATIVE, EXTERNAL APPROACH: ICD-10-PCS | Performed by: SURGERY

## 2018-08-02 PROCEDURE — 500698 HCHG HEMOCLIP, MEDIUM: Performed by: SURGERY

## 2018-08-02 PROCEDURE — 501837 HCHG SUTURE CV: Performed by: SURGERY

## 2018-08-02 PROCEDURE — 700112 HCHG RX REV CODE 229: Performed by: SURGERY

## 2018-08-02 PROCEDURE — 93010 ELECTROCARDIOGRAM REPORT: CPT | Performed by: INTERNAL MEDICINE

## 2018-08-02 PROCEDURE — 85027 COMPLETE CBC AUTOMATED: CPT

## 2018-08-02 PROCEDURE — A6402 STERILE GAUZE <= 16 SQ IN: HCPCS | Performed by: SURGERY

## 2018-08-02 PROCEDURE — 501838 HCHG SUTURE GENERAL: Performed by: SURGERY

## 2018-08-02 PROCEDURE — P9045 ALBUMIN (HUMAN), 5%, 250 ML: HCPCS | Mod: JG

## 2018-08-02 PROCEDURE — 03CK0ZZ EXTIRPATION OF MATTER FROM RIGHT INTERNAL CAROTID ARTERY, OPEN APPROACH: ICD-10-PCS | Performed by: SURGERY

## 2018-08-02 PROCEDURE — 160048 HCHG OR STATISTICAL LEVEL 1-5: Performed by: SURGERY

## 2018-08-02 PROCEDURE — 500389 HCHG DRAIN, RESERVOIR SUCT JP 100CC: Performed by: SURGERY

## 2018-08-02 PROCEDURE — 95940 IONM IN OPERATNG ROOM 15 MIN: CPT | Performed by: SURGERY

## 2018-08-02 PROCEDURE — 110454 HCHG SHELL REV 250: Performed by: SURGERY

## 2018-08-02 PROCEDURE — 700101 HCHG RX REV CODE 250: Performed by: SURGERY

## 2018-08-02 PROCEDURE — 160036 HCHG PACU - EA ADDL 30 MINS PHASE I: Performed by: SURGERY

## 2018-08-02 PROCEDURE — 500368 HCHG DRAIN, 7MM FLAT-FLUTED: Performed by: SURGERY

## 2018-08-02 PROCEDURE — 110372 HCHG SHELL REV 278: Performed by: SURGERY

## 2018-08-02 PROCEDURE — 160009 HCHG ANES TIME/MIN: Performed by: SURGERY

## 2018-08-02 PROCEDURE — 700102 HCHG RX REV CODE 250 W/ 637 OVERRIDE(OP)

## 2018-08-02 PROCEDURE — 700111 HCHG RX REV CODE 636 W/ 250 OVERRIDE (IP): Performed by: SURGERY

## 2018-08-02 PROCEDURE — A9270 NON-COVERED ITEM OR SERVICE: HCPCS | Performed by: SURGERY

## 2018-08-02 PROCEDURE — 160029 HCHG SURGERY MINUTES - 1ST 30 MINS LEVEL 4: Performed by: SURGERY

## 2018-08-02 PROCEDURE — 770006 HCHG ROOM/CARE - MED/SURG/GYN SEMI*

## 2018-08-02 PROCEDURE — 95955 EEG DURING SURGERY: CPT | Performed by: SURGERY

## 2018-08-02 PROCEDURE — 80048 BASIC METABOLIC PNL TOTAL CA: CPT

## 2018-08-02 PROCEDURE — 36415 COLL VENOUS BLD VENIPUNCTURE: CPT

## 2018-08-02 PROCEDURE — 51798 US URINE CAPACITY MEASURE: CPT

## 2018-08-02 PROCEDURE — 86850 RBC ANTIBODY SCREEN: CPT

## 2018-08-02 PROCEDURE — P9045 ALBUMIN (HUMAN), 5%, 250 ML: HCPCS | Mod: JG | Performed by: SURGERY

## 2018-08-02 PROCEDURE — C1768 GRAFT, VASCULAR: HCPCS | Performed by: SURGERY

## 2018-08-02 PROCEDURE — 700105 HCHG RX REV CODE 258: Performed by: SURGERY

## 2018-08-02 PROCEDURE — 501445 HCHG STAPLER, SKIN DISP: Performed by: SURGERY

## 2018-08-02 PROCEDURE — 700102 HCHG RX REV CODE 250 W/ 637 OVERRIDE(OP): Performed by: SURGERY

## 2018-08-02 PROCEDURE — C1725 CATH, TRANSLUMIN NON-LASER: HCPCS | Performed by: SURGERY

## 2018-08-02 PROCEDURE — 86901 BLOOD TYPING SEROLOGIC RH(D): CPT

## 2018-08-02 PROCEDURE — 160041 HCHG SURGERY MINUTES - EA ADDL 1 MIN LEVEL 4: Performed by: SURGERY

## 2018-08-02 PROCEDURE — 160035 HCHG PACU - 1ST 60 MINS PHASE I: Performed by: SURGERY

## 2018-08-02 PROCEDURE — 03UK0KZ SUPPLEMENT RIGHT INTERNAL CAROTID ARTERY WITH NONAUTOLOGOUS TISSUE SUBSTITUTE, OPEN APPROACH: ICD-10-PCS | Performed by: SURGERY

## 2018-08-02 PROCEDURE — 700111 HCHG RX REV CODE 636 W/ 250 OVERRIDE (IP)

## 2018-08-02 PROCEDURE — 93005 ELECTROCARDIOGRAM TRACING: CPT | Performed by: SURGERY

## 2018-08-02 PROCEDURE — 500257: Performed by: SURGERY

## 2018-08-02 DEVICE — PATCH .8X8CM XENOSURE BIOLOGIC VASCULAR---ORDER IN MULTIPLES OF 5---: Type: IMPLANTABLE DEVICE | Status: FUNCTIONAL

## 2018-08-02 RX ORDER — SIMVASTATIN 40 MG
40 TABLET ORAL
Status: DISCONTINUED | OUTPATIENT
Start: 2018-08-02 | End: 2018-08-03 | Stop reason: HOSPADM

## 2018-08-02 RX ORDER — SODIUM CHLORIDE, SODIUM LACTATE, POTASSIUM CHLORIDE, CALCIUM CHLORIDE 600; 310; 30; 20 MG/100ML; MG/100ML; MG/100ML; MG/100ML
INJECTION, SOLUTION INTRAVENOUS CONTINUOUS
Status: DISCONTINUED | OUTPATIENT
Start: 2018-08-02 | End: 2018-08-02

## 2018-08-02 RX ORDER — OXYCODONE HYDROCHLORIDE 5 MG/1
5 TABLET ORAL
Status: DISCONTINUED | OUTPATIENT
Start: 2018-08-02 | End: 2018-08-03 | Stop reason: HOSPADM

## 2018-08-02 RX ORDER — OXYBUTYNIN CHLORIDE 5 MG/1
5 TABLET ORAL
Status: DISCONTINUED | OUTPATIENT
Start: 2018-08-02 | End: 2018-08-03 | Stop reason: HOSPADM

## 2018-08-02 RX ORDER — DOCUSATE SODIUM 100 MG/1
100 CAPSULE, LIQUID FILLED ORAL 2 TIMES DAILY
Qty: 30 CAP | Refills: 3 | Status: SHIPPED | OUTPATIENT
Start: 2018-08-02

## 2018-08-02 RX ORDER — SODIUM CHLORIDE, SODIUM LACTATE, POTASSIUM CHLORIDE, CALCIUM CHLORIDE 600; 310; 30; 20 MG/100ML; MG/100ML; MG/100ML; MG/100ML
INJECTION, SOLUTION INTRAVENOUS CONTINUOUS
Status: DISCONTINUED | OUTPATIENT
Start: 2018-08-02 | End: 2018-08-03 | Stop reason: HOSPADM

## 2018-08-02 RX ORDER — SODIUM CHLORIDE, SODIUM LACTATE, POTASSIUM CHLORIDE, CALCIUM CHLORIDE 600; 310; 30; 20 MG/100ML; MG/100ML; MG/100ML; MG/100ML
INJECTION, SOLUTION INTRAVENOUS ONCE
Status: COMPLETED | OUTPATIENT
Start: 2018-08-02 | End: 2018-08-02

## 2018-08-02 RX ORDER — TAMSULOSIN HYDROCHLORIDE 0.4 MG/1
0.4 CAPSULE ORAL EVERY EVENING
Status: DISCONTINUED | OUTPATIENT
Start: 2018-08-02 | End: 2018-08-03 | Stop reason: HOSPADM

## 2018-08-02 RX ORDER — DEXAMETHASONE SODIUM PHOSPHATE 4 MG/ML
4 INJECTION, SOLUTION INTRA-ARTICULAR; INTRALESIONAL; INTRAMUSCULAR; INTRAVENOUS; SOFT TISSUE
Status: DISCONTINUED | OUTPATIENT
Start: 2018-08-02 | End: 2018-08-03 | Stop reason: HOSPADM

## 2018-08-02 RX ORDER — BENAZEPRIL HYDROCHLORIDE 20 MG/1
10 TABLET ORAL
Status: DISCONTINUED | OUTPATIENT
Start: 2018-08-02 | End: 2018-08-03 | Stop reason: HOSPADM

## 2018-08-02 RX ORDER — DIPHENHYDRAMINE HYDROCHLORIDE 50 MG/ML
25 INJECTION INTRAMUSCULAR; INTRAVENOUS EVERY 6 HOURS PRN
Status: DISCONTINUED | OUTPATIENT
Start: 2018-08-02 | End: 2018-08-03 | Stop reason: HOSPADM

## 2018-08-02 RX ORDER — OXYCODONE HYDROCHLORIDE 10 MG/1
10 TABLET ORAL EVERY 6 HOURS PRN
COMMUNITY

## 2018-08-02 RX ORDER — DESONIDE 0.5 MG/ML
LOTION TOPICAL 2 TIMES DAILY PRN
COMMUNITY

## 2018-08-02 RX ORDER — ALBUMIN, HUMAN INJ 5% 5 %
12.5 SOLUTION INTRAVENOUS ONCE
Status: COMPLETED | OUTPATIENT
Start: 2018-08-02 | End: 2018-08-02

## 2018-08-02 RX ORDER — TRAZODONE HYDROCHLORIDE 50 MG/1
50 TABLET ORAL NIGHTLY PRN
Status: DISCONTINUED | OUTPATIENT
Start: 2018-08-02 | End: 2018-08-03 | Stop reason: HOSPADM

## 2018-08-02 RX ORDER — HYDROCODONE BITARTRATE AND ACETAMINOPHEN 5; 325 MG/1; MG/1
1-2 TABLET ORAL EVERY 6 HOURS PRN
Qty: 20 TAB | Refills: 0 | Status: SHIPPED | OUTPATIENT
Start: 2018-08-02 | End: 2018-08-05

## 2018-08-02 RX ORDER — BACLOFEN 10 MG/1
10 TABLET ORAL 3 TIMES DAILY
Status: DISCONTINUED | OUTPATIENT
Start: 2018-08-02 | End: 2018-08-03 | Stop reason: HOSPADM

## 2018-08-02 RX ORDER — GABAPENTIN 300 MG/1
300 CAPSULE ORAL 3 TIMES DAILY
COMMUNITY

## 2018-08-02 RX ORDER — HYDRALAZINE HYDROCHLORIDE 20 MG/ML
10 INJECTION INTRAMUSCULAR; INTRAVENOUS
Status: DISCONTINUED | OUTPATIENT
Start: 2018-08-02 | End: 2018-08-03 | Stop reason: HOSPADM

## 2018-08-02 RX ORDER — ONDANSETRON 4 MG/1
4 TABLET, FILM COATED ORAL EVERY 4 HOURS PRN
Qty: 20 TAB | Refills: 3 | Status: SHIPPED | OUTPATIENT
Start: 2018-08-02

## 2018-08-02 RX ORDER — IPRATROPIUM BROMIDE AND ALBUTEROL SULFATE 2.5; .5 MG/3ML; MG/3ML
3 SOLUTION RESPIRATORY (INHALATION)
Status: DISCONTINUED | OUTPATIENT
Start: 2018-08-02 | End: 2018-08-03 | Stop reason: HOSPADM

## 2018-08-02 RX ORDER — LABETALOL HYDROCHLORIDE 5 MG/ML
10 INJECTION, SOLUTION INTRAVENOUS
Status: DISCONTINUED | OUTPATIENT
Start: 2018-08-02 | End: 2018-08-03 | Stop reason: HOSPADM

## 2018-08-02 RX ORDER — ALBUMIN, HUMAN INJ 5% 5 %
SOLUTION INTRAVENOUS
Status: COMPLETED
Start: 2018-08-02 | End: 2018-08-02

## 2018-08-02 RX ORDER — OXYCODONE HCL 5 MG/5 ML
SOLUTION, ORAL ORAL
Status: COMPLETED
Start: 2018-08-02 | End: 2018-08-02

## 2018-08-02 RX ORDER — SCOLOPAMINE TRANSDERMAL SYSTEM 1 MG/1
1 PATCH, EXTENDED RELEASE TRANSDERMAL
Status: DISCONTINUED | OUTPATIENT
Start: 2018-08-02 | End: 2018-08-03 | Stop reason: HOSPADM

## 2018-08-02 RX ORDER — DIPHENHYDRAMINE HCL 25 MG
25 TABLET ORAL EVERY 6 HOURS PRN
Status: DISCONTINUED | OUTPATIENT
Start: 2018-08-02 | End: 2018-08-03 | Stop reason: HOSPADM

## 2018-08-02 RX ORDER — HALOPERIDOL 5 MG/ML
1 INJECTION INTRAMUSCULAR EVERY 6 HOURS PRN
Status: DISCONTINUED | OUTPATIENT
Start: 2018-08-02 | End: 2018-08-03 | Stop reason: HOSPADM

## 2018-08-02 RX ORDER — KETOROLAC TROMETHAMINE 30 MG/ML
15 INJECTION, SOLUTION INTRAMUSCULAR; INTRAVENOUS EVERY 6 HOURS
Status: DISCONTINUED | OUTPATIENT
Start: 2018-08-02 | End: 2018-08-03 | Stop reason: HOSPADM

## 2018-08-02 RX ORDER — BACLOFEN 10 MG/1
10 TABLET ORAL 3 TIMES DAILY
COMMUNITY

## 2018-08-02 RX ORDER — LIDOCAINE HYDROCHLORIDE 10 MG/ML
0.5 INJECTION, SOLUTION INFILTRATION; PERINEURAL
Status: ACTIVE | OUTPATIENT
Start: 2018-08-02 | End: 2018-08-03

## 2018-08-02 RX ORDER — DOCUSATE SODIUM 100 MG/1
100 CAPSULE, LIQUID FILLED ORAL 2 TIMES DAILY
Status: DISCONTINUED | OUTPATIENT
Start: 2018-08-02 | End: 2018-08-03 | Stop reason: HOSPADM

## 2018-08-02 RX ORDER — GABAPENTIN 300 MG/1
300 CAPSULE ORAL 3 TIMES DAILY
Status: DISCONTINUED | OUTPATIENT
Start: 2018-08-02 | End: 2018-08-03 | Stop reason: HOSPADM

## 2018-08-02 RX ORDER — CALCIUM CARBONATE 500 MG/1
500 TABLET, CHEWABLE ORAL
Status: DISCONTINUED | OUTPATIENT
Start: 2018-08-02 | End: 2018-08-03 | Stop reason: HOSPADM

## 2018-08-02 RX ORDER — OMEPRAZOLE 20 MG/1
20 CAPSULE, DELAYED RELEASE ORAL
Status: DISCONTINUED | OUTPATIENT
Start: 2018-08-02 | End: 2018-08-03 | Stop reason: HOSPADM

## 2018-08-02 RX ORDER — ONDANSETRON 2 MG/ML
4 INJECTION INTRAMUSCULAR; INTRAVENOUS EVERY 4 HOURS PRN
Status: DISCONTINUED | OUTPATIENT
Start: 2018-08-02 | End: 2018-08-03 | Stop reason: HOSPADM

## 2018-08-02 RX ORDER — OXYCODONE HYDROCHLORIDE 10 MG/1
10 TABLET ORAL
Status: DISCONTINUED | OUTPATIENT
Start: 2018-08-02 | End: 2018-08-03 | Stop reason: HOSPADM

## 2018-08-02 RX ORDER — POTASSIUM CHLORIDE 20 MEQ/1
40 TABLET, EXTENDED RELEASE ORAL EVERY EVENING
COMMUNITY

## 2018-08-02 RX ORDER — SERTRALINE HYDROCHLORIDE 100 MG/1
100 TABLET, FILM COATED ORAL
Status: DISCONTINUED | OUTPATIENT
Start: 2018-08-02 | End: 2018-08-03 | Stop reason: HOSPADM

## 2018-08-02 RX ORDER — ERGOCALCIFEROL 1.25 MG/1
50000 CAPSULE ORAL
COMMUNITY

## 2018-08-02 RX ORDER — HYDROCHLOROTHIAZIDE 25 MG/1
25 TABLET ORAL
Status: DISCONTINUED | OUTPATIENT
Start: 2018-08-02 | End: 2018-08-03 | Stop reason: HOSPADM

## 2018-08-02 RX ORDER — SODIUM CHLORIDE, SODIUM LACTATE, POTASSIUM CHLORIDE, CALCIUM CHLORIDE 600; 310; 30; 20 MG/100ML; MG/100ML; MG/100ML; MG/100ML
500 INJECTION, SOLUTION INTRAVENOUS ONCE
Status: COMPLETED | OUTPATIENT
Start: 2018-08-02 | End: 2018-08-02

## 2018-08-02 RX ORDER — ACETAMINOPHEN 325 MG/1
650 TABLET ORAL EVERY 6 HOURS
Status: DISCONTINUED | OUTPATIENT
Start: 2018-08-02 | End: 2018-08-03 | Stop reason: HOSPADM

## 2018-08-02 RX ADMIN — GABAPENTIN 300 MG: 300 CAPSULE ORAL at 18:07

## 2018-08-02 RX ADMIN — EPHEDRINE SULFATE 5 MG: 50 INJECTION INTRAMUSCULAR; INTRAVENOUS; SUBCUTANEOUS at 14:16

## 2018-08-02 RX ADMIN — OXYCODONE HYDROCHLORIDE 5 MG: 5 SOLUTION ORAL at 15:47

## 2018-08-02 RX ADMIN — SODIUM CHLORIDE, POTASSIUM CHLORIDE, SODIUM LACTATE AND CALCIUM CHLORIDE: 600; 310; 30; 20 INJECTION, SOLUTION INTRAVENOUS at 22:02

## 2018-08-02 RX ADMIN — FENTANYL CITRATE 25 MCG: 50 INJECTION, SOLUTION INTRAMUSCULAR; INTRAVENOUS at 15:52

## 2018-08-02 RX ADMIN — ASPIRIN 81 MG: 81 TABLET, COATED ORAL at 20:46

## 2018-08-02 RX ADMIN — SODIUM CHLORIDE, POTASSIUM CHLORIDE, SODIUM LACTATE AND CALCIUM CHLORIDE 500 ML: 600; 310; 30; 20 INJECTION, SOLUTION INTRAVENOUS at 18:44

## 2018-08-02 RX ADMIN — EPHEDRINE SULFATE 5 MG: 50 INJECTION INTRAMUSCULAR; INTRAVENOUS; SUBCUTANEOUS at 15:22

## 2018-08-02 RX ADMIN — OXYCODONE HYDROCHLORIDE 10 MG: 10 TABLET ORAL at 18:07

## 2018-08-02 RX ADMIN — ACETAMINOPHEN 650 MG: 325 TABLET, FILM COATED ORAL at 23:20

## 2018-08-02 RX ADMIN — FAMOTIDINE 20 MG: 10 INJECTION, SOLUTION INTRAVENOUS at 18:07

## 2018-08-02 RX ADMIN — DOCUSATE SODIUM 100 MG: 100 CAPSULE, LIQUID FILLED ORAL at 18:06

## 2018-08-02 RX ADMIN — KETOROLAC TROMETHAMINE 15 MG: 30 INJECTION, SOLUTION INTRAMUSCULAR at 20:46

## 2018-08-02 RX ADMIN — SIMVASTATIN 40 MG: 40 TABLET, FILM COATED ORAL at 20:46

## 2018-08-02 RX ADMIN — TAMSULOSIN HYDROCHLORIDE 0.4 MG: 0.4 CAPSULE ORAL at 18:07

## 2018-08-02 RX ADMIN — FENTANYL CITRATE 25 MCG: 50 INJECTION, SOLUTION INTRAMUSCULAR; INTRAVENOUS at 15:47

## 2018-08-02 RX ADMIN — ALBUMIN (HUMAN) 250 ML: 2.5 SOLUTION INTRAVENOUS at 15:00

## 2018-08-02 RX ADMIN — BACLOFEN 10 MG: 10 TABLET ORAL at 18:07

## 2018-08-02 RX ADMIN — EPHEDRINE SULFATE 5 MG: 50 INJECTION INTRAVENOUS at 20:29

## 2018-08-02 RX ADMIN — EPHEDRINE SULFATE 5 MG: 50 INJECTION INTRAMUSCULAR; INTRAVENOUS; SUBCUTANEOUS at 22:03

## 2018-08-02 RX ADMIN — ALBUMIN (HUMAN) 12.5 G: 12.5 INJECTION, SOLUTION INTRAVENOUS at 20:30

## 2018-08-02 RX ADMIN — ACETAMINOPHEN 650 MG: 325 TABLET, FILM COATED ORAL at 18:07

## 2018-08-02 RX ADMIN — SODIUM CHLORIDE, POTASSIUM CHLORIDE, SODIUM LACTATE AND CALCIUM CHLORIDE: 600; 310; 30; 20 INJECTION, SOLUTION INTRAVENOUS at 17:56

## 2018-08-02 RX ADMIN — OXYBUTYNIN CHLORIDE 5 MG: 5 TABLET ORAL at 20:46

## 2018-08-02 RX ADMIN — EPHEDRINE SULFATE 10 MG: 50 INJECTION INTRAMUSCULAR; INTRAVENOUS; SUBCUTANEOUS at 14:42

## 2018-08-02 RX ADMIN — EPHEDRINE SULFATE 5 MG: 50 INJECTION INTRAMUSCULAR; INTRAVENOUS; SUBCUTANEOUS at 14:21

## 2018-08-02 RX ADMIN — FENTANYL CITRATE 25 MCG: 50 INJECTION, SOLUTION INTRAMUSCULAR; INTRAVENOUS at 15:58

## 2018-08-02 RX ADMIN — OMEPRAZOLE 20 MG: 20 CAPSULE, DELAYED RELEASE ORAL at 20:46

## 2018-08-02 RX ADMIN — ALBUMIN, HUMAN INJ 5% 12.5 G: 5 SOLUTION at 17:15

## 2018-08-02 ASSESSMENT — PAIN SCALES - GENERAL
PAINLEVEL_OUTOF10: 0
PAINLEVEL_OUTOF10: 2
PAINLEVEL_OUTOF10: 0
PAINLEVEL_OUTOF10: 5
PAINLEVEL_OUTOF10: 0
PAINLEVEL_OUTOF10: 3
PAINLEVEL_OUTOF10: 0
PAINLEVEL_OUTOF10: 2
PAINLEVEL_OUTOF10: 0
PAINLEVEL_OUTOF10: 4
PAINLEVEL_OUTOF10: 2
PAINLEVEL_OUTOF10: 8
PAINLEVEL_OUTOF10: 2
PAINLEVEL_OUTOF10: 5
PAINLEVEL_OUTOF10: 2

## 2018-08-02 ASSESSMENT — LIFESTYLE VARIABLES
EVER FELT BAD OR GUILTY ABOUT YOUR DRINKING: NO
CONSUMPTION TOTAL: INCOMPLETE
HOW MANY TIMES IN THE PAST YEAR HAVE YOU HAD 5 OR MORE DRINKS IN A DAY: 0
CONSUMPTION TOTAL: NEGATIVE
EVER_SMOKED: YES
HAVE PEOPLE ANNOYED YOU BY CRITICIZING YOUR DRINKING: NO
TOTAL SCORE: 1
TOTAL SCORE: 1
ON A TYPICAL DAY WHEN YOU DRINK ALCOHOL HOW MANY DRINKS DO YOU HAVE: 2
ALCOHOL_USE: YES
EVER HAD A DRINK FIRST THING IN THE MORNING TO STEADY YOUR NERVES TO GET RID OF A HANGOVER: NO
AVERAGE NUMBER OF DAYS PER WEEK YOU HAVE A DRINK CONTAINING ALCOHOL: 7
HAVE YOU EVER FELT YOU SHOULD CUT DOWN ON YOUR DRINKING: YES
HOW MANY TIMES IN THE PAST YEAR HAVE YOU HAD 5 OR MORE DRINKS IN A DAY: 0
TOTAL SCORE: 1

## 2018-08-02 ASSESSMENT — COPD QUESTIONNAIRES
HAVE YOU SMOKED AT LEAST 100 CIGARETTES IN YOUR ENTIRE LIFE: YES
COPD SCREENING SCORE: 5
IN THE PAST 12 MONTHS DO YOU DO LESS THAN YOU USED TO BECAUSE OF YOUR BREATHING PROBLEMS: DISAGREE/UNSURE
DO YOU EVER COUGH UP ANY MUCUS OR PHLEGM?: NO/ONLY WITH OCCASIONAL COLDS OR INFECTIONS
DURING THE PAST 4 WEEKS HOW MUCH DID YOU FEEL SHORT OF BREATH: SOME OF THE TIME

## 2018-08-02 ASSESSMENT — PATIENT HEALTH QUESTIONNAIRE - PHQ9
2. FEELING DOWN, DEPRESSED, IRRITABLE, OR HOPELESS: NOT AT ALL
SUM OF ALL RESPONSES TO PHQ9 QUESTIONS 1 AND 2: 0
1. LITTLE INTEREST OR PLEASURE IN DOING THINGS: NOT AT ALL

## 2018-08-02 NOTE — OP REPORT
Vascular Surgery Operative Note  --------------------------------------------    Date of Service: 8/2/2018    Patient Name:  Duane Stuart Wilkerson    Patient MRN:  [id number]    --------------------------------------------------------------------------------------------------    Preoperative Diagnosis:  - Asymptomatic right carotid stenosis  - Hypertension  - Hyperlipidemia  - COPD    Postoperative Diagnosis:  same    Procedure:  1) right carotid endarterectomy with neuromonitoring    -------------------------------------------------------------------------------------------------    Surgeon:   Nicolas Marie MD    Assistant:   Yaron SNYDER    Anesthesia:   GETA    EBL:    35 cc    Blood Products:  none    Heparin:   Systemically heparinized, 8000 units    Specimen:   None sent    Complications:  None     Disposition:   PACU in stable condition       -----------------------------------------------------------------------------------------------------    History:  Duane Stuart Wilkerson is a 72 y.o. male with asymptomatic right carotid stenosis.    I had a very detailed, thorough discussion with the patient regarding the severity of his carotid disease and that he is at high risk of stroke.  I explained the primary purpose of this operation is to prevent stroke from the plaque in the carotid artery.  I explained the details of the operation including neuro monitoring and possible use of a shunt.  I discussed the alternatives of optimal medical management or stenting, although carotid endarterectomy is preferable in the setting of such severe plaque and in someone who is acceptable risk for surgery.  I discussed the potential risks, including but not limited to bleeding, infection, injury to vessels or nerves, and risks of anesthesia. I explained the risk that the operation itself can cause a stroke, although the risk of stroke from the operation is less than the risk of stroke if the plaque is not treated, and  thus carotid endarterectomy is recommended.  All of their questions were answered. Patient understands and agrees to proceed.    Procedure Summary:  Following informed consent, patient was brought to the OR where general anesthesia was administered. Patient was positioned, neuromointoring was put in place and the right neck was prepped and draped in the usual sterile fashion.  Timeout was called to identify the correct patient, team and equipment.  Everyone was in agreement.  Procedure began with injection of local anesthesia along the right SCM and then a longitudinal incision was made and carried down through each layer using cautery to assure hemostasis.  The common facial vein was identified and ligated.  The common, external and internal carotid were identified and dissected circumferentially proximally and distally and encircled with loops.    Patient was systemically heparinized and then the artery was clamped, with the ICA being applied first.  The carotid was opened with a longitudinal arteriotomy and a soft plaque was seen.  There were no changes on EEG monitoring. Endarterectomy of the plaque was performed and the distal ICA endpoint was tacked with prolene sutures.  Once complete, a bovine pericardial patch was sutured with a running 5-0 prolene suture.  The artery was flushed and copiously irrigated, the suture line was completed.  The clamps were removed, with the ICA being removed last, and flow was restored.  There was a continuous flow signal in the ICA at this point.  The heparin was reversed with protamine. Topical hemostatic was applied.  A 10 flat BRIDGETTE drain was placed and secured with a nylon suture.     At this point we had good hemostasis.  The platysma was reapproximated with interrupted vicryl sutures.  The skin was reapproximated with a running subcuticular suture.  A sterile dressing was placed. Patient was extubated and returned to PACU in stable condition.  All counts were correct at the  conclusion of the case.         Nicolas Marie MD  General and Vascular Surgery  [facility]  Cell: [phone]

## 2018-08-02 NOTE — OR NURSING
1500-pt BP still dropping into 80's systolic, albumin started as ordered, neck dressing drainage increasing, will continue to monitor

## 2018-08-02 NOTE — H&P
Vascular Surgery H&P  -------------------------------------------------------------------------------------------------  Date: 8/2/2018    Surgeon: Nicolas Marie M.D. - [facility]  -------------------------------------------------------------------------------------------------    HPI:  This is a 72 y.o. male who is presenting today for scheduled right carotid endarterectomy.  No specific complaints at this time. Questions addressed.      Past Medical History:   Diagnosis Date   • Arthritis     left hip   • Breath shortness    • Cancer (CMS-[facility]) ([facility]) 6-2017    skin, basal cell   • COPD    • Dental disorder     dentures upper and lower   • Emphysema of lung (CMS-HCC) ([facility])    • ETOH abuse    • Heart burn    • High cholesterol    • History of tonsillectomy    • Hyperlipemia    • Hypertension    • Indigestion    • Other specified disorder of intestines     diarrhea and constipation, off and on   • Pain 06-28-12    left hip, 4/10; 6- back   • Psychiatric problem 2008    Depression   • Rash of face     Patient states severe, uses Desonide daily as needed       Past Surgical History:   Procedure Laterality Date   • FEMORAL ENDARTERECTOMY Left 10/26/2017    Procedure: FEMORAL ENDARTERECTOMY;  Surgeon: Nicolas Marie M.D.;  Location: [facility];  Service: Vascular   • AORTOGRAM WITH RUNOFF  10/26/2017    Procedure: AORTOGRAM WITH RUNOFF- ABDOMINAL;  Surgeon: Nicolas Marie M.D.;  Location: [facility];  Service: Vascular   • EXPLORATORY LAPAROTOMY  7/24/2013    Performed by Prachi Staples M.D. at [facility]   • CYSTOSCOPY  7/24/2013    Performed by Prachi Staples M.D. at SURGERY [facility]   • BOWEL RESECTION  7/24/2013    Performed by Prachi Staples M.D. at [facility]   • SPINAL CORD STIMULATOR  8/7/2012    Performed by WINSTON MARTINES at [facility]   • THORACIC LAMINECTOMY  8/7/2012    Performed by WINSTON MARTINES at  "[facility]   • RECOVERY  7/5/2012    Performed by SURGERY, IR-RECOVERY at SURGERY SAME DAY [facility]   • SPINAL CORD STIMULATOR     • LUMBAR FUSION POSTERIOR  2010   • OTHER NEUROLOGICAL SURG  2010    lumbar fusion   • OTHER NEUROLOGICAL SURG  2008    surgery for \"spinal stenosis\"   • OTHER ORTHOPEDIC SURGERY      right little finger   • OTHER ORTHOPEDIC SURGERY      right thumb fused       No current facility-administered medications for this encounter.        Social History     Social History   • Marital status:      Spouse name: N/A   • Number of children: N/A   • Years of education: N/A     Occupational History   • Not on file.     Social History Main Topics   • Smoking status: Current Every Day Smoker     Packs/day: 1.00     Years: 50.00     Types: Cigarettes   • Smokeless tobacco: Never Used   • Alcohol use Yes      Comment: daily 2-3 glasses of wine   • Drug use: No   • Sexual activity: Not on file     Other Topics Concern   • Not on file     Social History Narrative   • No narrative on file       No family history on file.    Allergies:  Patient has no known allergies.    Review of Systems:  Noncontributory except as per HPI    Physical Exam:  There were no vitals taken for this visit.    Constitutional: Alert, oriented, no acute distress  HEENT:  Normocephalic and atraumatic, EOMI  Neck:   Supple, no JVD,   Cardiovascular: Regular rate and rhythm,   Pulmonary:  Good air entry bilaterally,    Abdominal:  Soft, non-tender, non-distended     Aortic impulse not widened  Musculoskeletal: No edema, no tenderness  Neurological:  CN II-XII grossly intact, no focal deficits  Skin:   Skin is warm and dry. No rash noted.  Psychiatric:  Normal mood and affect.    Assessment/Plan:  This is a 72 y.o. male here today for R CEA.    I explained the details of the operation, alternatives, and potential risks, including but not limited to bleeding, infection, and risks of anesthesia.  All " questions were answered. Patient understands and agrees to proceed.      Nicolas Marie MD  [facility] (General and Vascular Surgery)  Cell: [phone] (text or call is fine, if you don't reach me please try my office)  Office: [phone]    8/2/2018    8:30 AM  ___________________________________________________________________  Patient:Duane Ryan Leon   MRN:[id number]   CSN:[id number]

## 2018-08-02 NOTE — PROGRESS NOTES
The Medication Reconciliation process has been completed by interviewing the patient    Allergies have been reviewed  Antibiotic use in 30 days - none    Home Pharmacy:  [facility]

## 2018-08-02 NOTE — PROGRESS NOTES
Discharge Instructions    Procedure: carotid endarterectomy    1. ACTIVITIES: Upon discharge from the hospital, the day of surgery it is requested that you do no significant physical activity and no driving as you have had sedation. The day after surgery, you may resume activities of daily living, but for two weeks, no strenuous activities or heavy lifting (greater than 15 pounds).     2. DRIVING: You may drive whenever you are off pain medications and are able to perform the activities needed to drive, i.e. turning, bending, twisting, etc.     3. WOUND: It is not unusual for patients to experience swelling and even bruising, as well as a small amount of drainage from the incision. This is normal and will resolve over the next few days.     4. ICE: you may place ice on the wound to decrease the swelling for the first 24 hours and then discontinue. Apply ice for 20 minutes and then remove for 20 minutes, alternating while awake.    5. BATHING: Remove your white bandage 1-2 days after discharge, no new bandage is required. OK to shower with the bandage on and after the bandage is removed the incision can get wet directly.    6. PAIN MEDICATION: You will be given a prescription for pain medication at discharge. Please take these as directed. It is important to remember not to take medications on an empty stomach as this may cause nausea.     7. BOWEL FUNCTION: After surgery, it is not uncommon for patients to experience constipation. This is due to decreasing activity levels as well as pain medications. You may wish to use a stool softener beginning immediately after surgery, and you may or may not need to use a laxative (Milk of Magnesia, Ex-lax; Senokot, etc.) as well.     8 .CALL IF YOU HAVE: (1) Fevers to more than 101.0 F, (2) Unusual or excessive pain, (3) Drainage or fluid from incision that may be foul smelling, increased tenderness or soreness at the wound or the wound edges are no longer together, redness or  swelling at the incision site. Please do not hesitate to call with any other questions.     9. APPOINTMENT: Contact our office at [phone] for a follow-up appointment 2 weeks following your procedure.     If you have any additional questions, please do not hesitate to call the office and speak to either myself or the physician on call.     Office addresses:   [address] [place] [address] [address]  [facility] [address]  [phone]    Nicolas Marie MD  [facility]  [phone]

## 2018-08-03 VITALS
BODY MASS INDEX: 21.44 KG/M2 | WEIGHT: 158.29 LBS | SYSTOLIC BLOOD PRESSURE: 108 MMHG | TEMPERATURE: 98 F | DIASTOLIC BLOOD PRESSURE: 59 MMHG | OXYGEN SATURATION: 95 % | HEIGHT: 72 IN | HEART RATE: 61 BPM | RESPIRATION RATE: 17 BRPM

## 2018-08-03 LAB
ANION GAP SERPL CALC-SCNC: 7 MMOL/L (ref 0–11.9)
ANISOCYTOSIS BLD QL SMEAR: ABNORMAL
BASOPHILS # BLD AUTO: 0 % (ref 0–1.8)
BASOPHILS # BLD: 0 K/UL (ref 0–0.12)
BUN SERPL-MCNC: 13 MG/DL (ref 8–22)
CALCIUM SERPL-MCNC: 8.4 MG/DL (ref 8.5–10.5)
CHLORIDE SERPL-SCNC: 103 MMOL/L (ref 96–112)
CO2 SERPL-SCNC: 25 MMOL/L (ref 20–33)
CREAT SERPL-MCNC: 1.05 MG/DL (ref 0.5–1.4)
EOSINOPHIL # BLD AUTO: 0 K/UL (ref 0–0.51)
EOSINOPHIL NFR BLD: 0 % (ref 0–6.9)
GLUCOSE SERPL-MCNC: 121 MG/DL (ref 65–99)
HCT VFR BLD AUTO: 32.9 % (ref 42–52)
HGB BLD-MCNC: 10.6 G/DL (ref 14–18)
LYMPHOCYTES # BLD AUTO: 0.37 K/UL (ref 1–4.8)
LYMPHOCYTES NFR BLD: 4.3 % (ref 22–41)
MAGNESIUM SERPL-MCNC: 1.5 MG/DL (ref 1.5–2.5)
MANUAL DIFF BLD: NORMAL
MCH RBC QN AUTO: 32.4 PG (ref 27–33)
MCHC RBC AUTO-ENTMCNC: 32.2 G/DL (ref 33.7–35.3)
MCV RBC AUTO: 100.6 FL (ref 81.4–97.8)
MICROCYTES BLD QL SMEAR: ABNORMAL
MONOCYTES # BLD AUTO: 0.3 K/UL (ref 0–0.85)
MONOCYTES NFR BLD AUTO: 3.5 % (ref 0–13.4)
MORPHOLOGY BLD-IMP: NORMAL
NEUTROPHILS # BLD AUTO: 8.02 K/UL (ref 1.82–7.42)
NEUTROPHILS NFR BLD: 92.2 % (ref 44–72)
NRBC # BLD AUTO: 0 K/UL
NRBC BLD-RTO: 0 /100 WBC
PHOSPHATE SERPL-MCNC: 3.2 MG/DL (ref 2.5–4.5)
PLATELET # BLD AUTO: 166 K/UL (ref 164–446)
PLATELET BLD QL SMEAR: NORMAL
PMV BLD AUTO: 9.3 FL (ref 9–12.9)
POTASSIUM SERPL-SCNC: 3.8 MMOL/L (ref 3.6–5.5)
RBC # BLD AUTO: 3.27 M/UL (ref 4.7–6.1)
RBC BLD AUTO: PRESENT
SODIUM SERPL-SCNC: 135 MMOL/L (ref 135–145)
WBC # BLD AUTO: 8.7 K/UL (ref 4.8–10.8)

## 2018-08-03 PROCEDURE — 84100 ASSAY OF PHOSPHORUS: CPT

## 2018-08-03 PROCEDURE — 700105 HCHG RX REV CODE 258: Performed by: SURGERY

## 2018-08-03 PROCEDURE — A9270 NON-COVERED ITEM OR SERVICE: HCPCS | Performed by: SURGERY

## 2018-08-03 PROCEDURE — 36415 COLL VENOUS BLD VENIPUNCTURE: CPT

## 2018-08-03 PROCEDURE — 83735 ASSAY OF MAGNESIUM: CPT

## 2018-08-03 PROCEDURE — A9270 NON-COVERED ITEM OR SERVICE: HCPCS | Mod: JG | Performed by: SURGERY

## 2018-08-03 PROCEDURE — 85027 COMPLETE CBC AUTOMATED: CPT

## 2018-08-03 PROCEDURE — 700102 HCHG RX REV CODE 250 W/ 637 OVERRIDE(OP): Mod: JG | Performed by: SURGERY

## 2018-08-03 PROCEDURE — 700111 HCHG RX REV CODE 636 W/ 250 OVERRIDE (IP): Performed by: SURGERY

## 2018-08-03 PROCEDURE — 85007 BL SMEAR W/DIFF WBC COUNT: CPT

## 2018-08-03 PROCEDURE — 700112 HCHG RX REV CODE 229: Performed by: SURGERY

## 2018-08-03 PROCEDURE — 80048 BASIC METABOLIC PNL TOTAL CA: CPT

## 2018-08-03 RX ADMIN — DOCUSATE SODIUM 100 MG: 100 CAPSULE, LIQUID FILLED ORAL at 06:32

## 2018-08-03 RX ADMIN — SODIUM CHLORIDE, POTASSIUM CHLORIDE, SODIUM LACTATE AND CALCIUM CHLORIDE: 600; 310; 30; 20 INJECTION, SOLUTION INTRAVENOUS at 06:33

## 2018-08-03 RX ADMIN — ACETAMINOPHEN 650 MG: 325 TABLET, FILM COATED ORAL at 06:32

## 2018-08-03 RX ADMIN — KETOROLAC TROMETHAMINE 15 MG: 30 INJECTION, SOLUTION INTRAMUSCULAR at 03:33

## 2018-08-03 RX ADMIN — FAMOTIDINE 20 MG: 10 INJECTION, SOLUTION INTRAVENOUS at 06:35

## 2018-08-03 RX ADMIN — BACLOFEN 10 MG: 10 TABLET ORAL at 06:32

## 2018-08-03 RX ADMIN — GABAPENTIN 300 MG: 300 CAPSULE ORAL at 06:32

## 2018-08-03 ASSESSMENT — PAIN SCALES - GENERAL
PAINLEVEL_OUTOF10: 3

## 2018-08-03 ASSESSMENT — COPD QUESTIONNAIRES
DO YOU EVER COUGH UP ANY MUCUS OR PHLEGM?: NO/ONLY WITH OCCASIONAL COLDS OR INFECTIONS
DURING THE PAST 4 WEEKS HOW MUCH DID YOU FEEL SHORT OF BREATH: SOME OF THE TIME
HAVE YOU SMOKED AT LEAST 100 CIGARETTES IN YOUR ENTIRE LIFE: YES
COPD SCREENING SCORE: 5

## 2018-08-03 ASSESSMENT — LIFESTYLE VARIABLES: EVER_SMOKED: YES

## 2018-08-03 NOTE — PROGRESS NOTES
Patient vital signs BP (!) 90/51   Pulse 67   Temp 36.8 °C (98.3 °F)   Resp 16   Ht 1.829 m (6')   Wt 71.8 kg (158 lb 4.6 oz)   SpO2 97%   BMI 21.47 kg/m²     Called MD Marie and updated on vitals, received order for 500mL bolus of LR.  To call back if SBP <100 after completion.  Order for bolus placed. Bolus hung.

## 2018-08-03 NOTE — PROGRESS NOTES
A/Ox 4.  VSS.  Reports minimal neck and back pain 3 /10, refusing medication at this time.    Right neck incision, gauze and tegaderm in place, CDI.  Right neck BRIDGETTE, dressing in place CDI, minimal drainage.    + CEVALLOS, denies numbness and tingling, generalized weakness.  0.5L NC, satting >90%, denies SOB or difficulty breathing.  SCDs refused.  + normoactive BSx4, denies flatus, denies BM this shift, LBM 07/31, denies n/v.  Tolerated full liquid diet well.  + void, QS, will continue to monitor UOP.  Up to edge of bed with SBA, tolerates activity well.  Edge of bed, repositions self frequently.  IVF to PIV, PO intake encouraged.    Call light within reach, calls appropriately.  Bed in lowest locked position.

## 2018-08-03 NOTE — DISCHARGE INSTRUCTIONS
Discharge Instructions    Discharged to home by car with relative. Discharged via wheelchair, hospital escort: Yes.  Special equipment needed: Not Applicable    Be sure to schedule a follow-up appointment with your primary care doctor or any specialists as instructed.     Discharge Plan:   Diet Plan: Discussed  Activity Level: Discussed  Smoking Cessation Offered: Patient Counseled  Confirmed Follow up Appointment: Patient to Call and Schedule Appointment  Confirmed Symptoms Management: Discussed  Medication Reconciliation Updated: Yes  Pneumococcal Vaccine Administered/Refused: Not given - Patient refused pneumococcal vaccine  Influenza Vaccine Indication: Patient Refuses    I understand that a diet low in cholesterol, fat, and sodium is recommended for good health. Unless I have been given specific instructions below for another diet, I accept this instruction as my diet prescription.   Other diet: as tolerated    Special Instructions: None    · Is patient discharged on Warfarin / Coumadin?   No     Carotid Endarterectomy, Care After  Refer to this sheet in the next few weeks. These instructions provide you with information on caring for yourself after your procedure. Your health care provider may also give you more specific instructions. Your treatment has been planned according to current medical practices, but problems sometimes occur. Call your health care provider if you have any problems or questions after your procedure.   WHAT TO EXPECT AFTER THE PROCEDURE  You may have some pain or an ache in your neck for up to 2 weeks. This is normal. Recovery time varies depending on your age, condition, general health, and other factors. You will likely be able to return to a normal lifestyle within a few weeks.   HOME CARE INSTRUCTIONS   · Take showers if your health care provider approves. Do not take baths, swim, or use a hot tub until your health care provider approves.    · Take medicines only as directed by your  health care provider. If a blood thinner (anticoagulant) is prescribed after surgery, take this medicine exactly as directed.  · Change bandages (dressings) as directed by your health care provider.    · Avoid heavy lifting or strenuous activity until your health care provider says it is okay. Resume your normal activities as directed.    · Stop smoking if you smoke. This is a risk factor for poor wound healing.    · Stop taking the pill (oral contraceptives) unless your health care provider recommends otherwise.    · Maintain good control of your blood pressure.    · Exercise regularly or as instructed by your health care provider.    · Eat a heart-healthy diet. Talk to your health care provider about how to lower blood lipids (cholesterol and triglycerides).    · Keep all follow-up visits as directed by your health care provider. Make an appointment for the removal of stitches (sutures) or staples.  SEEK MEDICAL CARE IF:   · You have increased bleeding from the incision site.    · You notice redness, swelling, or increasing pain at the incision site.    · You notice swelling in your neck or have difficulty breathing or talking.    · You notice a bad smell or pus coming from the incision site or dressing.    · You have a fever.   · You develop a rash.    · You develop any reaction or side effects to medicine given.    SEEK IMMEDIATE MEDICAL CARE IF:   · Your initial symptoms are getting worse rather than better.    · You develop any abnormal bruising or bleeding.    · You have difficulty breathing.    · You develop chest pain, shortness of breath, or pain or swelling in your legs.    · You have a return of symptoms or problems that caused you to have this surgery.    · You develop a temporary loss of vision.    · You develop temporary numbness on one side.    · You develop a temporary inability to speak (aphasia).    · You develop temporary weakness.    MAKE SURE YOU:   · Understand these instructions.  · Will  watch your condition.  · Will get help right away if you are not doing well or get worse.     This information is not intended to replace advice given to you by your health care provider. Make sure you discuss any questions you have with your health care provider.     Document Released: 07/07/2006 Document Revised: 01/08/2016 Document Reviewed: 05/21/2014  [employer] Interactive Patient Education ©2016 [employer]    1. ACTIVITIES: Upon discharge from the hospital, the day of surgery it is requested that you do no significant physical activity and no driving as you have had sedation. The day after surgery, you may resume activities of daily living, but for two weeks, no strenuous activities or heavy lifting (greater than 15 pounds).      2. DRIVING: You may drive whenever you are off pain medications and are able to perform the activities needed to drive, i.e. turning, bending, twisting, etc.      3. WOUND: It is not unusual for patients to experience swelling and even bruising, as well as a small amount of drainage from the incision. This is normal and will resolve over the next few days.      4. ICE: you may place ice on the wound to decrease the swelling for the first 24 hours and then discontinue. Apply ice for 20 minutes and then remove for 20 minutes, alternating while awake.     5. BATHING: Remove your white bandage 1-2 days after discharge, no new bandage is required. OK to shower with the bandage on and after the bandage is removed the incision can get wet directly.     6. PAIN MEDICATION: You will be given a prescription for pain medication at discharge. Please take these as directed. It is important to remember not to take medications on an empty stomach as this may cause nausea.      7. BOWEL FUNCTION: After surgery, it is not uncommon for patients to experience constipation. This is due to decreasing activity levels as well as pain medications. You may wish to use a stool softener beginning immediately  after surgery, and you may or may not need to use a laxative (Milk of Magnesia, Ex-lax; Senokot, etc.) as well.      8 .CALL IF YOU HAVE: (1) Fevers to more than 101.0 F, (2) Unusual or excessive pain, (3) Drainage or fluid from incision that may be foul smelling, increased tenderness or soreness at the wound or the wound edges are no longer together, redness or swelling at the incision site. Please do not hesitate to call with any other questions.      9. APPOINTMENT: Contact our office at [phone] for a follow-up appointment 2 weeks following your procedure.      If you have any additional questions, please do not hesitate to call the office and speak to either myself or the physician on call.      Office addresses:   [address] [place] [address] [address]  [facility] [address]  [phone]     Nicolas Marie MD  [facility]  [phone]       Depression / Suicide Risk    As you are discharged from this [facility] facility, it is important to learn how to keep safe from harming yourself.    Recognize the warning signs:  · Abrupt changes in personality, positive or negative- including increase in energy   · Giving away possessions  · Change in eating patterns- significant weight changes-  positive or negative  · Change in sleeping patterns- unable to sleep or sleeping all the time   · Unwillingness or inability to communicate  · Depression  · Unusual sadness, discouragement and loneliness  · Talk of wanting to die  · Neglect of personal appearance   · Rebelliousness- reckless behavior  · Withdrawal from people/activities they love  · Confusion- inability to concentrate     If you or a loved one observes any of these behaviors or has concerns about self-harm, here's what you can do:  · Talk about it- your feelings and reasons for harming yourself  · Remove any means that you might use to hurt yourself (examples: pills, rope, extension cords, firearm)  · Get professional help from the community (Mental Health,  Substance Abuse, psychological counseling)  · Do not be alone:Call your Safe Contact- someone whom you trust who will be there for you.  · Call your local CRISIS HOTLINE 873-6656 or 384-980-9343  · Call your local Children's Mobile Crisis Response Team [place] (876) 003-5568 or www.[employer]  · Call the toll free National Suicide Prevention Hotlines   · National Suicide Prevention Lifeline 139-221-KPWZ (2723)  · National Hope Line Network 800-SUICIDE (301-6091)

## 2018-08-03 NOTE — PROGRESS NOTES
Pt report received. Assumed care of pt. Pt sleeping in bed with no signs of distress, call light within reach, personal items available. Will continue to monitor.

## 2018-08-03 NOTE — PROGRESS NOTES
Discharging Patient home per physician order.  Discharged with wife.  Demonstrated understanding of discharge instructions, follow up appointments, home medications, prescriptions, home care for surgical wound, and nursing care instructions given for wound .  Ambulating with minimal assistance and FWW , voiding without difficulty, pain well controlled, tolerating oral medications, oxygen saturation greater than 90% , tolerating diet.   Educational handouts given and discussed. PIV removed. Verbalized understanding of discharge instructions and educational handouts.  All questions answered.  Belongings with patient at time of discharge.

## 2018-08-03 NOTE — PROGRESS NOTES
Surgery    Minimal pain  Tolerating PO  Ambulating  AFVSS  Abd SND mild TTP  Incisions CDI      Home today    Nicolas Marie MD  General and Vascular Surgery  [facility]  Cell: [phone]

## 2018-08-03 NOTE — PROGRESS NOTES
Report received from Belinda GUILLEN.  Patient arrived to floor via gurney.   slid to bed with 2 assistance.    Wife to bedside.  Admit completed.  Dressing CDI.   BRIDGETTE with scant serosang output.   Passed bedside swallow evaluation. Tolerating clear liquid diet with no signs of aspiration.  Medicated for pain with prn. Medicated per MAR.  IVF in place.  scd placed.    in place.    Educated on admission including: unit policies, welcome packet, white board, TV system, call light, call before fall, plan of care, how to report/escalate concerns, VS schedule, IS use, lab schedule, oral care expectations, ambulation expectations, and up to chair for all meals expectation if possible.    Call light and belongings within reach.  All questions answered.

## 2018-08-03 NOTE — PROGRESS NOTES
Received report from Belinda GUILLEN in PACU.  Awaiting patient arrival to floor with IVPB albumin running.

## 2018-08-03 NOTE — PROGRESS NOTES
Pt remains hypotensive and asymptomatic.  MD Marie updated.  New order for albumin and ephedrine.  Will continue to monitor.

## 2018-08-03 NOTE — PROGRESS NOTES
Pt still hypotensive and asymptomatic.  MD Marie updated again.  New order for bolus and ephedrine.  Will continue to monitor.

## 2018-10-02 ENCOUNTER — RX ONLY (OUTPATIENT)
Age: 73
Setting detail: RX ONLY
End: 2018-10-02

## 2018-10-02 RX ORDER — DESONIDE 0.5 MG/ML
LOTION TOPICAL
Qty: 1 | Refills: 0 | Status: ERX | COMMUNITY
Start: 2018-10-02

## 2018-10-23 ENCOUNTER — RX ONLY (OUTPATIENT)
Age: 73
Setting detail: RX ONLY
End: 2018-10-23

## 2018-10-23 ENCOUNTER — APPOINTMENT (RX ONLY)
Dept: URBAN - METROPOLITAN AREA CLINIC 20 | Facility: CLINIC | Age: 73
Setting detail: DERMATOLOGY
End: 2018-10-23

## 2018-10-23 DIAGNOSIS — D18.0 HEMANGIOMA: ICD-10-CM

## 2018-10-23 DIAGNOSIS — Z85.828 PERSONAL HISTORY OF OTHER MALIGNANT NEOPLASM OF SKIN: ICD-10-CM

## 2018-10-23 DIAGNOSIS — L57.0 ACTINIC KERATOSIS: ICD-10-CM

## 2018-10-23 DIAGNOSIS — D22 MELANOCYTIC NEVI: ICD-10-CM

## 2018-10-23 DIAGNOSIS — L82.1 OTHER SEBORRHEIC KERATOSIS: ICD-10-CM

## 2018-10-23 DIAGNOSIS — Z71.89 OTHER SPECIFIED COUNSELING: ICD-10-CM

## 2018-10-23 DIAGNOSIS — L21.8 OTHER SEBORRHEIC DERMATITIS: ICD-10-CM

## 2018-10-23 DIAGNOSIS — L81.4 OTHER MELANIN HYPERPIGMENTATION: ICD-10-CM

## 2018-10-23 PROBLEM — E78.5 HYPERLIPIDEMIA, UNSPECIFIED: Status: ACTIVE | Noted: 2018-10-23

## 2018-10-23 PROBLEM — I10 ESSENTIAL (PRIMARY) HYPERTENSION: Status: ACTIVE | Noted: 2018-10-23

## 2018-10-23 PROBLEM — D48.5 NEOPLASM OF UNCERTAIN BEHAVIOR OF SKIN: Status: ACTIVE | Noted: 2018-10-23

## 2018-10-23 PROBLEM — D22.62 MELANOCYTIC NEVI OF LEFT UPPER LIMB, INCLUDING SHOULDER: Status: ACTIVE | Noted: 2018-10-23

## 2018-10-23 PROBLEM — D18.01 HEMANGIOMA OF SKIN AND SUBCUTANEOUS TISSUE: Status: ACTIVE | Noted: 2018-10-23

## 2018-10-23 PROBLEM — D22.61 MELANOCYTIC NEVI OF RIGHT UPPER LIMB, INCLUDING SHOULDER: Status: ACTIVE | Noted: 2018-10-23

## 2018-10-23 PROBLEM — D22.5 MELANOCYTIC NEVI OF TRUNK: Status: ACTIVE | Noted: 2018-10-23

## 2018-10-23 PROCEDURE — ? SUNSCREEN RECOMMENDATIONS

## 2018-10-23 PROCEDURE — ? BIOPSY BY SHAVE METHOD

## 2018-10-23 PROCEDURE — ? PRESCRIPTION

## 2018-10-23 PROCEDURE — 11100: CPT | Mod: 59

## 2018-10-23 PROCEDURE — 17003 DESTRUCT PREMALG LES 2-14: CPT

## 2018-10-23 PROCEDURE — ? COUNSELING

## 2018-10-23 PROCEDURE — 99214 OFFICE O/P EST MOD 30 MIN: CPT | Mod: 25

## 2018-10-23 PROCEDURE — ? LIQUID NITROGEN

## 2018-10-23 PROCEDURE — 11101: CPT

## 2018-10-23 PROCEDURE — 17000 DESTRUCT PREMALG LESION: CPT

## 2018-10-23 RX ORDER — KETOCONAZOLE 20 MG/G
CREAM TOPICAL
Qty: 1 | Refills: 1 | Status: ERX | COMMUNITY
Start: 2018-10-23

## 2018-10-23 RX ORDER — TRIAMCINOLONE ACETONIDE 0.25 MG/ML
LOTION TOPICAL
Qty: 1 | Refills: 0 | Status: ERX | COMMUNITY
Start: 2018-10-23

## 2018-10-23 RX ADMIN — KETOCONAZOLE: 20 CREAM TOPICAL at 21:05

## 2018-10-23 ASSESSMENT — LOCATION DETAILED DESCRIPTION DERM
LOCATION DETAILED: LEFT VENTRAL PROXIMAL FOREARM
LOCATION DETAILED: LEFT SUPERIOR FOREHEAD
LOCATION DETAILED: LEFT CENTRAL MALAR CHEEK
LOCATION DETAILED: RIGHT CENTRAL MALAR CHEEK
LOCATION DETAILED: SUPERIOR THORACIC SPINE
LOCATION DETAILED: LEFT PROXIMAL DORSAL FOREARM
LOCATION DETAILED: INFERIOR THORACIC SPINE
LOCATION DETAILED: RIGHT RADIAL DORSAL HAND
LOCATION DETAILED: LEFT RADIAL DORSAL HAND
LOCATION DETAILED: RIGHT PROXIMAL DORSAL FOREARM
LOCATION DETAILED: LEFT INFERIOR LATERAL FOREHEAD
LOCATION DETAILED: RIGHT VENTRAL DISTAL FOREARM
LOCATION DETAILED: RIGHT INFERIOR MEDIAL FOREHEAD
LOCATION DETAILED: RIGHT MEDIAL UPPER BACK
LOCATION DETAILED: RIGHT INFERIOR UPPER BACK
LOCATION DETAILED: LEFT CENTRAL FRONTAL SCALP
LOCATION DETAILED: LEFT CENTRAL PARIETAL SCALP
LOCATION DETAILED: RIGHT SUPERIOR HELIX
LOCATION DETAILED: RIGHT SUPERIOR MEDIAL MALAR CHEEK

## 2018-10-23 ASSESSMENT — LOCATION ZONE DERM
LOCATION ZONE: EAR
LOCATION ZONE: TRUNK
LOCATION ZONE: FACE
LOCATION ZONE: SCALP
LOCATION ZONE: ARM
LOCATION ZONE: HAND

## 2018-10-23 ASSESSMENT — LOCATION SIMPLE DESCRIPTION DERM
LOCATION SIMPLE: RIGHT UPPER BACK
LOCATION SIMPLE: LEFT FOREHEAD
LOCATION SIMPLE: LEFT HAND
LOCATION SIMPLE: RIGHT CHEEK
LOCATION SIMPLE: LEFT CHEEK
LOCATION SIMPLE: RIGHT FOREHEAD
LOCATION SIMPLE: RIGHT EAR
LOCATION SIMPLE: LEFT SCALP
LOCATION SIMPLE: SCALP
LOCATION SIMPLE: RIGHT HAND
LOCATION SIMPLE: UPPER BACK
LOCATION SIMPLE: RIGHT FOREARM
LOCATION SIMPLE: LEFT FOREARM

## 2018-10-23 NOTE — PROCEDURE: LIQUID NITROGEN
Number Of Freeze-Thaw Cycles: 2 freeze-thaw cycles
Post-Care Instructions: I reviewed with the patient in detail post-care instructions. Patient is to wear sunprotection, and avoid picking at any of the treated lesions. Pt may apply Vaseline to crusted or scabbing areas.
Duration Of Freeze Thaw-Cycle (Seconds): 10
Render Post-Care Instructions In Note?: yes
Consent: The patient's consent was obtained including but not limited to risks of crusting, scabbing, blistering, scarring, darker or lighter pigmentary change, recurrence, incomplete removal and infection. RTC in 2 months if lesion(s) persistent.
Detail Level: Detailed

## 2018-10-23 NOTE — PROCEDURE: BIOPSY BY SHAVE METHOD
Bill For Surgical Tray: no
Render Post-Care Instructions In Note?: yes
Post-Care Instructions: I reviewed with the patient in detail post-care instructions. Patient is to keep the biopsy site clean once daily and then apply petroleum and bandaid  until healed.
Additional Anesthesia Volume In Cc (Will Not Render If 0): 0
Detail Level: Detailed
Hemostasis: Drysol and Electrocautery
X Size Of Lesion In Cm: 0.3
Biopsy Method: Personna blade
Consent: Written consent was obtained and risks were reviewed including but not limited to scarring, infection, bleeding, scabbing, incomplete removal, nerve damage and allergy to anesthesia.
Anesthesia Volume In Cc: 1
Dressing: Band-Aid
Wound Care: Bacitracin
Lab Facility: 
Depth Of Biopsy: dermis
Type Of Destruction Used: Curettage
Anesthesia Type: 1% lidocaine with 1:100,000 epinephrine and a 1:12 solution of 8.4% sodium bicarbonate
Billing Type: Third-Party Bill
Lab: [id number]
Biopsy Type: H and E
Notification Instructions: Patient will be notified of biopsy results. However, patient instructed to call the office if not contacted within 2 weeks.
X Size Of Lesion In Cm: 0.6

## 2019-05-28 ENCOUNTER — HOSPITAL ENCOUNTER (OUTPATIENT)
Dept: RADIOLOGY | Facility: MEDICAL CENTER | Age: 74
End: 2019-05-28
Attending: NEUROLOGICAL SURGERY
Payer: MEDICARE

## 2019-05-28 DIAGNOSIS — G89.4 CHRONIC PAIN SYNDROME: ICD-10-CM

## 2019-05-28 PROCEDURE — 72110 X-RAY EXAM L-2 SPINE 4/>VWS: CPT

## (undated) DEVICE — TUBING CLEARLINK DUO-VENT - C-FLO (48EA/CA)

## (undated) DEVICE — MASK ANESTHESIA ADULT  - (100/CA)

## (undated) DEVICE — RESERVOIR SUCTION 100 CC - SILICONE (20EA/CA)

## (undated) DEVICE — SYRINGE 30 ML LL (56/BX)

## (undated) DEVICE — HEAD HOLDER JUNIOR/ADULT

## (undated) DEVICE — CANNULA W/ SUPPLY TUBING O2 - (50/CA)

## (undated) DEVICE — SODIUM CHL. INJ. 0.9% 500ML (24EA/CA 50CA/PF)

## (undated) DEVICE — NEPTUNE 4 PORT MANIFOLD - (20/PK)

## (undated) DEVICE — CLIP SM INTNL HRZN TI ESCP LGT - (24EA/PK 25PK/BX)

## (undated) DEVICE — PROTECTOR ULNA NERVE - (36PR/CA)

## (undated) DEVICE — TUBE E-T HI-LO CUFF 7.5MM (10EA/PK)

## (undated) DEVICE — SENSOR SPO2 NEO LNCS ADHESIVE (20/BX) SEE USER NOTES

## (undated) DEVICE — SUTURE 6-0 PROLENE BV-1 D/A 30 (36PK/BX)"

## (undated) DEVICE — GOWN SURGEONS X-LARGE - DISP. (30/CA)

## (undated) DEVICE — PACK MAJOR BASIN - (2EA/CA)

## (undated) DEVICE — GLOVE BIOGEL SZ 8 SURGICAL PF LTX - (50PR/BX 4BX/CA)

## (undated) DEVICE — CLIP MED INTNL HRZN TI ESCP - (25/BX)

## (undated) DEVICE — DRESSING TRANSPARENT FILM TEGADERM 2.375 X 2.75"  (100EA/BX)"

## (undated) DEVICE — WIPE INSTRUMENT MICROWIPE (20EA/SP)

## (undated) DEVICE — DISC, CD-R PLAT 700MB MEMOREX

## (undated) DEVICE — POLY UMBILICAL TAPE 1/8X30 - (36/BX)

## (undated) DEVICE — PACK ENDO VASCULAR - (6/CA)

## (undated) DEVICE — SPONGE GAUZESTER. 2X2 4-PL - (2/PK 50PK/BX 30BX/CS)

## (undated) DEVICE — KIT ROOM DECONTAMINATION

## (undated) DEVICE — KIT SURGIFLO W/OUT THROMBIN - (6EA/CA)

## (undated) DEVICE — SUTURE 2-0 SILK 12 X 18" (36PK/BX)"

## (undated) DEVICE — KIT ANESTHESIA W/CIRCUIT & 3/LT BAG W/FILTER (20EA/CA)

## (undated) DEVICE — SODIUM CHL IRRIGATION 0.9% 1000ML (12EA/CA)

## (undated) DEVICE — GUIDEWIRES STARTER (PTFE COATED) ROSEN 0.035 180CM 4 1.5 MM J

## (undated) DEVICE — SHUNT CAROTID FLEXCEL 14.5CM - 5/BX

## (undated) DEVICE — GLIDECATH ANGLE 4F X 70CM (5EA/BX)

## (undated) DEVICE — SUCTION INSTRUMENT YANKAUER BULBOUS TIP W/O VENT (50EA/CA)

## (undated) DEVICE — SOD. CHL. INJ. 0.9% 250 ML - (36/CA 50CA/PF)

## (undated) DEVICE — DRESSING TRANSPARENT FILM TEGADERM 4 X 4.75" (50EA/BX)"

## (undated) DEVICE — SUTURE 4-0 30CM STRATAFIX SPIRAL PS-2 (12EA/BX)

## (undated) DEVICE — SUTURE 5-0 PROLENE BLUE C-1 HS 1 X 30 (36EA/BX)"

## (undated) DEVICE — GOWN WARMING STANDARD FLEX - (30/CA)

## (undated) DEVICE — TOWELS CLOTH SURGICAL - (4/PK 20PK/CA)

## (undated) DEVICE — ELECTRODE DUAL RETURN W/ CORD - (50/PK)

## (undated) DEVICE — INTRAOP NEURO IN OR 1:1 PER 15 MIN

## (undated) DEVICE — SLEEVE, VASO, THIGH, MED

## (undated) DEVICE — SOD. CHL. INJ. 0.9% 1000 ML - (14EA/CA 60CA/PF)

## (undated) DEVICE — DRAPE LARGE 3 QUARTER - (20/CA)

## (undated) DEVICE — CLIP MED LG INTNL HRZN TI ESCP - (20/BX)

## (undated) DEVICE — CLOSURE SKIN STRIP 1/2 X 4 IN - (STERI STRIP) (50/BX 4BX/CA)

## (undated) DEVICE — BLADE SURGICAL #11 - (50/BX)

## (undated) DEVICE — PACK MINOR BASIN - (2EA/CA)

## (undated) DEVICE — BLADE SURGICAL CLIPPER - (50EA/CA)

## (undated) DEVICE — DRAPE MAGNETIC (INSTRA-MAG) - (30/CA)

## (undated) DEVICE — BAG DECANTER (50EA/CS)

## (undated) DEVICE — CHLORAPREP 26 ML APPLICATOR - ORANGE TINT(25/CA)

## (undated) DEVICE — DRAIN J-VAC 7MM FLAT - (10EA/CA)

## (undated) DEVICE — VESSELOOP MINI BLUE STERILE - SURG-I-LOOP (10EA/BX)

## (undated) DEVICE — GOWN SURGEONS LARGE - (32/CA)

## (undated) DEVICE — DRAIN J-VAC 10MM FLAT - (10/CA)

## (undated) DEVICE — LACTATED RINGERS INJ 1000 ML - (14EA/CA 60CA/PF)

## (undated) DEVICE — SPONGE GAUZESTER 4 X 4 4PLY - (128PK/CA)

## (undated) DEVICE — CANISTER SUCTION 3000ML MECHANICAL FILTER AUTO SHUTOFF MEDI-VAC NONSTERILE LF DISP  (40EA/CA)

## (undated) DEVICE — SUTURE CV

## (undated) DEVICE — STAPLER SKIN DISP - (6/BX 10BX/CA) VISISTAT

## (undated) DEVICE — SUTURE GENERAL

## (undated) DEVICE — GLOVE BIOGEL SZ 6 PF LATEX - (50EA/BX 4BX/CA)

## (undated) DEVICE — Device

## (undated) DEVICE — SUTURE 2-0 VICRYL PLUS CT-1 - 8 X 18 INCH(12/BX)

## (undated) DEVICE — STERI STRIP COMPOUND BENZOIN - TINCTURE 0.6ML WITH APPLICATOR (40EA/BX)

## (undated) DEVICE — SET LEADWIRE 5 LEAD BEDSIDE DISPOSABLE ECG (1SET OF 5/EA)

## (undated) DEVICE — ELECTRODE 850 FOAM ADHESIVE - HYDROGEL RADIOTRNSPRNT (50/PK)

## (undated) DEVICE — SET EXTENSION WITH 2 PORTS (48EA/CA) ***PART #2C8610 IS A SUBSTITUTE*****

## (undated) DEVICE — GLOVE BIOGEL SZ 7 SURGICAL PF LTX - (50PR/BX 4BX/CA)

## (undated) DEVICE — KIT RADIAL ARTERY 20GA W/MAX BARRIER AND BIOPATCH  (5EA/CA) #10740 IS FOR THE SET RADIAL ARTERIAL

## (undated) DEVICE — VESSELOOP MAXI BLUE STERILE- SURG-I-LOOP (10EA/BX)

## (undated) DEVICE — SUTURE 3-0 SILK 12 X 18 IN - (36/BX)

## (undated) DEVICE — SHEET THYROID - (10EA/CA)

## (undated) DEVICE — GLOVE BIOGEL PI ORTHO SZ 6 SURGICAL PF LF (40PR/BX)

## (undated) DEVICE — DRAPE SURGICAL U 77X120 - (10/CA)

## (undated) DEVICE — SYRINGE 20 ML LL (50EA/BX 4BX/CA)

## (undated) DEVICE — SYRINGE SAFETY 10 ML 18 GA X 1 1/2 BLUNT LL (100/BX 4BX/CA)

## (undated) DEVICE — SET INTRO MIRCROPUNCTURE - MPIS-501-SST

## (undated) DEVICE — SUTURE 2-0 ETHILON FS - (36/BX) 18 INCH

## (undated) DEVICE — GELAQUASONIC 100 ULTRASOUND - 48/BX 20GM STERILE FOIL POUCH